# Patient Record
Sex: FEMALE | Race: WHITE | HISPANIC OR LATINO | Employment: PART TIME | URBAN - METROPOLITAN AREA
[De-identification: names, ages, dates, MRNs, and addresses within clinical notes are randomized per-mention and may not be internally consistent; named-entity substitution may affect disease eponyms.]

---

## 2020-01-17 PROBLEM — M22.40 CHONDROMALACIA PATELLAE: Status: ACTIVE | Noted: 2020-01-17

## 2020-01-17 PROBLEM — M25.569 KNEE PAIN: Status: ACTIVE | Noted: 2020-01-17

## 2020-01-19 PROBLEM — T78.05XA ANAPHYLAXIS DUE TO TREE NUT: Status: ACTIVE | Noted: 2020-01-19

## 2020-01-19 PROBLEM — H10.13 ALLERGIC CONJUNCTIVITIS OF BOTH EYES: Status: ACTIVE | Noted: 2020-01-19

## 2020-01-19 PROBLEM — J30.1 CHRONIC ALLERGIC RHINITIS DUE TO POLLEN: Status: ACTIVE | Noted: 2020-01-19

## 2020-01-19 PROBLEM — H01.131: Status: ACTIVE | Noted: 2020-01-19

## 2020-01-19 PROBLEM — H01.132: Status: ACTIVE | Noted: 2020-01-19

## 2020-01-19 PROBLEM — L20.84 INTRINSIC ATOPIC DERMATITIS: Status: ACTIVE | Noted: 2020-01-19

## 2020-01-19 PROBLEM — J45.991 COUGH VARIANT ASTHMA: Status: ACTIVE | Noted: 2020-01-19

## 2020-01-19 PROBLEM — J30.81 ALLERGIC RHINITIS DUE TO ANIMAL (CAT) (DOG) HAIR AND DANDER: Status: ACTIVE | Noted: 2020-01-19

## 2020-01-19 PROBLEM — T78.1XXA POLLEN-FOOD ALLERGY: Status: ACTIVE | Noted: 2020-01-19

## 2020-01-19 PROBLEM — J30.89 ALLERGIC RHINITIS DUE TO HOUSE DUST MITE: Status: ACTIVE | Noted: 2020-01-19

## 2020-12-14 ENCOUNTER — APPOINTMENT (OUTPATIENT)
Dept: URGENT CARE | Facility: MEDICAL CENTER | Age: 20
End: 2020-12-14

## 2020-12-14 ENCOUNTER — APPOINTMENT (OUTPATIENT)
Dept: LAB | Facility: MEDICAL CENTER | Age: 20
End: 2020-12-14

## 2020-12-14 ENCOUNTER — TRANSCRIBE ORDERS (OUTPATIENT)
Dept: URGENT CARE | Facility: MEDICAL CENTER | Age: 20
End: 2020-12-14

## 2020-12-14 DIAGNOSIS — Z02.1 PRE-EMPLOYMENT HEALTH SCREENING EXAMINATION: Primary | ICD-10-CM

## 2020-12-14 DIAGNOSIS — Z02.1 PRE-EMPLOYMENT HEALTH SCREENING EXAMINATION: ICD-10-CM

## 2020-12-14 PROCEDURE — 86480 TB TEST CELL IMMUN MEASURE: CPT

## 2020-12-14 PROCEDURE — 36415 COLL VENOUS BLD VENIPUNCTURE: CPT

## 2020-12-16 LAB
GAMMA INTERFERON BACKGROUND BLD IA-ACNC: 0.04 IU/ML
M TB IFN-G BLD-IMP: NEGATIVE
M TB IFN-G CD4+ BCKGRND COR BLD-ACNC: 0 IU/ML
M TB IFN-G CD4+ BCKGRND COR BLD-ACNC: 0.01 IU/ML
MITOGEN IGNF BCKGRD COR BLD-ACNC: >10 IU/ML

## 2021-01-09 ENCOUNTER — IMMUNIZATIONS (OUTPATIENT)
Dept: FAMILY MEDICINE CLINIC | Facility: HOSPITAL | Age: 21
End: 2021-01-09

## 2021-01-09 DIAGNOSIS — Z23 ENCOUNTER FOR IMMUNIZATION: ICD-10-CM

## 2021-01-09 PROCEDURE — 0011A SARS-COV-2 / COVID-19 MRNA VACCINE (MODERNA) 100 MCG: CPT

## 2021-01-09 PROCEDURE — 91301 SARS-COV-2 / COVID-19 MRNA VACCINE (MODERNA) 100 MCG: CPT

## 2021-01-29 ENCOUNTER — LAB (OUTPATIENT)
Dept: LAB | Age: 21
End: 2021-01-29

## 2021-01-29 ENCOUNTER — TRANSCRIBE ORDERS (OUTPATIENT)
Dept: ADMINISTRATIVE | Age: 21
End: 2021-01-29

## 2021-01-29 DIAGNOSIS — Z01.84 IMMUNITY STATUS TESTING: Primary | ICD-10-CM

## 2021-01-29 DIAGNOSIS — Z01.84 IMMUNITY STATUS TESTING: ICD-10-CM

## 2021-01-29 LAB — RUBV IGG SERPL IA-ACNC: 144.9 IU/ML

## 2021-01-29 PROCEDURE — 86762 RUBELLA ANTIBODY: CPT

## 2021-01-29 PROCEDURE — 86480 TB TEST CELL IMMUN MEASURE: CPT

## 2021-01-29 PROCEDURE — 86787 VARICELLA-ZOSTER ANTIBODY: CPT

## 2021-01-29 PROCEDURE — 86765 RUBEOLA ANTIBODY: CPT

## 2021-01-29 PROCEDURE — 86735 MUMPS ANTIBODY: CPT

## 2021-02-02 LAB — MEV IGG SER QL: NORMAL

## 2021-02-03 LAB
GAMMA INTERFERON BACKGROUND BLD IA-ACNC: 0.05 IU/ML
M TB IFN-G BLD-IMP: NEGATIVE
M TB IFN-G CD4+ BCKGRND COR BLD-ACNC: -0.01 IU/ML
M TB IFN-G CD4+ BCKGRND COR BLD-ACNC: 0 IU/ML
MITOGEN IGNF BCKGRD COR BLD-ACNC: >10 IU/ML
MUV IGG SER QL: NORMAL
VZV IGG SER IA-ACNC: NORMAL

## 2021-02-05 ENCOUNTER — IMMUNIZATIONS (OUTPATIENT)
Dept: FAMILY MEDICINE CLINIC | Facility: HOSPITAL | Age: 21
End: 2021-02-05

## 2021-02-05 DIAGNOSIS — Z23 ENCOUNTER FOR IMMUNIZATION: Primary | ICD-10-CM

## 2021-02-05 PROCEDURE — 91301 SARS-COV-2 / COVID-19 MRNA VACCINE (MODERNA) 100 MCG: CPT

## 2021-02-05 PROCEDURE — 0012A SARS-COV-2 / COVID-19 MRNA VACCINE (MODERNA) 100 MCG: CPT

## 2021-02-15 PROBLEM — R21 FACIAL RASH: Status: ACTIVE | Noted: 2020-06-30

## 2021-02-15 PROBLEM — Z91.018 TREE NUT ALLERGY: Status: ACTIVE | Noted: 2020-06-30

## 2021-12-20 PROCEDURE — U0003 INFECTIOUS AGENT DETECTION BY NUCLEIC ACID (DNA OR RNA); SEVERE ACUTE RESPIRATORY SYNDROME CORONAVIRUS 2 (SARS-COV-2) (CORONAVIRUS DISEASE [COVID-19]), AMPLIFIED PROBE TECHNIQUE, MAKING USE OF HIGH THROUGHPUT TECHNOLOGIES AS DESCRIBED BY CMS-2020-01-R: HCPCS | Performed by: PHYSICIAN ASSISTANT

## 2021-12-20 PROCEDURE — U0005 INFEC AGEN DETEC AMPLI PROBE: HCPCS | Performed by: PHYSICIAN ASSISTANT

## 2022-08-06 ENCOUNTER — HOSPITAL ENCOUNTER (EMERGENCY)
Facility: HOSPITAL | Age: 22
Discharge: HOME | End: 2022-08-06
Attending: EMERGENCY MEDICINE
Payer: COMMERCIAL

## 2022-08-06 VITALS
RESPIRATION RATE: 18 BRPM | SYSTOLIC BLOOD PRESSURE: 104 MMHG | HEART RATE: 78 BPM | OXYGEN SATURATION: 96 % | DIASTOLIC BLOOD PRESSURE: 60 MMHG

## 2022-08-06 DIAGNOSIS — T78.2XXA ANAPHYLAXIS, INITIAL ENCOUNTER: Primary | ICD-10-CM

## 2022-08-06 PROCEDURE — 96375 TX/PRO/DX INJ NEW DRUG ADDON: CPT

## 2022-08-06 PROCEDURE — 96374 THER/PROPH/DIAG INJ IV PUSH: CPT

## 2022-08-06 PROCEDURE — 96372 THER/PROPH/DIAG INJ SC/IM: CPT

## 2022-08-06 PROCEDURE — 99283 EMERGENCY DEPT VISIT LOW MDM: CPT

## 2022-08-06 PROCEDURE — 99284 EMERGENCY DEPT VISIT MOD MDM: CPT | Performed by: EMERGENCY MEDICINE

## 2022-08-06 RX ORDER — EPINEPHRINE 0.3 MG/.3ML
0.3 INJECTION SUBCUTANEOUS ONCE
Qty: 0.3 ML | Refills: 0 | Status: SHIPPED | OUTPATIENT
Start: 2022-08-06 | End: 2022-08-06

## 2022-08-06 RX ORDER — DIPHENHYDRAMINE HYDROCHLORIDE 50 MG/ML
25 INJECTION INTRAMUSCULAR; INTRAVENOUS ONCE
Status: COMPLETED | OUTPATIENT
Start: 2022-08-06 | End: 2022-08-06

## 2022-08-06 RX ORDER — DEXAMETHASONE SODIUM PHOSPHATE 10 MG/ML
10 INJECTION, SOLUTION INTRAMUSCULAR; INTRAVENOUS ONCE
Status: COMPLETED | OUTPATIENT
Start: 2022-08-06 | End: 2022-08-06

## 2022-08-06 RX ORDER — EPINEPHRINE 1 MG/ML
0.5 INJECTION, SOLUTION, CONCENTRATE INTRAVENOUS ONCE
Status: COMPLETED | OUTPATIENT
Start: 2022-08-06 | End: 2022-08-06

## 2022-08-06 RX ADMIN — EPINEPHRINE 0.5 MG: 1 INJECTION, SOLUTION, CONCENTRATE INTRAVENOUS at 19:31

## 2022-08-06 RX ADMIN — DEXAMETHASONE SODIUM PHOSPHATE 10 MG: 10 INJECTION, SOLUTION INTRAMUSCULAR; INTRAVENOUS at 19:38

## 2022-08-06 RX ADMIN — DIPHENHYDRAMINE HYDROCHLORIDE 25 MG: 50 INJECTION, SOLUTION INTRAMUSCULAR; INTRAVENOUS at 19:38

## 2022-08-06 NOTE — ED ATTENDING ATTESTATION
Final Diagnosis:  1  Anaphylaxis, initial encounter           I, Vanessa Bridges MD, saw and evaluated the patient  All available labs and X-rays were ordered by me or the resident and have been reviewed by myself  I discussed the patient with the resident / non-physician and agree with the resident's / non-physician practitioner's findings and plan as documented in the resident's / non-physician practicitioner's note, except where noted  At this point, I agree with the current assessment done in the ED  I was present during key portions of all procedures performed unless otherwise stated  Chief Complaint   Patient presents with    Allergic Reaction     Pt ate tree nuts on accident and has a slight rash on her face  Pt states she started feeling her throat start to swell     This is a 24 y o  female presenting for evaluation of ingestion of tree nuts  She ate food, started to notice lip swelling, redness, throat swelling sensation  Hx of anaphylaxis and is supposed to carry around an EPI pen but doesn't  So came in  No f/ch/n/v  No CP/SOB  No wheezing    Seen immediately on arrival    A: intact  B: bilateral breath sounds  C: 2+ pulses, mild tachycardia HR   D: awake, alert  E: redness around lips; obvious swellinjg of lips  No urticaria    A/P:  - I think that the patient might have anaphylaxis and will treat as such  - Will do EPI (0 01mg/kg, max 0 5mg) immediately into the thigh for better absorption   - Will place IV, solumedrol (125mg), Pepcid (20mg), Benadryl (50mg)  - IVF for hydration meanwhile  - Will observe for at least 2 hours  The incidence of clinically important bimodal reactions are extremely rare  ([facility] Andre;63(6):736-44 e2  doi: 10 1016/j annemergmed  2013 10 017  Epub 2013 Nov 13;  Of 178,799 ED visits --> 2,819 (0 66%) encounters --> 496 anaphylactic and 2,323 allergic reactions --> Five clinically important biphasic reactions were identified (0 18%) --> 2 occurring during the ED visit and 3 postdischarge --> no fatalities  Further, in the anaphylaxis and allergic reaction groups, clinically important biphasic reactions occurred in 2 patients (0 40%; 95% CI 0 07% to 1 6%) and 3 patients (0 13%; 95% CI 0 03% to 0 41%), respectively  )  - If continued anaphylaxis without improvement, will discuss do an EPI drip  Will start with dirty EPI drip (1mg of EPI + 1000mL NaCl 0 9% making 1mcg/mL -->  wide-open 18-gauge IV, the patient will receive about 20-30 mL/min (or 20-30 mcg/min) of epinephrine or push-dose epi (0 1 mg or 100 mcg over 5 minutes = 20 mcg per minute)  - If rebound or continued, will admit    Kenzie Hooks DC home with EPI script + steroids (Decadron 10mg here OR Prednisone x3-5 days) + anti-histamine (Benadryl OR Atarax)  - Patient is to f/u with the patient's PCP for re-evaluation  - 13 point ROS was performed and all are normal unless stated in the history above  - Nursing note reviewed  Vitals reviewed  - Orders placed by myself and/or advanced practitioner / resident     - Previous chart was reviewed  - No language barrier    - History obtained from patient  - There are no limitations to the history obtained  - Critical care time: 35 minutes  - Critical care time was exclusive of seperately bilable procedures and treating other patients as well as teaching time  - Critical care was necessary to treat or prevent imminent or life-threatening deterioration of the following condition: anaphylaxis evaluation  - Critical care time was spent personally by me on the following activities as well as the above as per the ED course and rest of chart: obtaining history from patient / surrogate, developement of a treatment plan, evaluation of patient's response to the treatment, examination of the patient, ordering/performing treatements and interventions, re-evaluation of the patient's condition, review of old charts       PMH:   has a past medical history of Allergies, Asthma, and Eczema  PSH:   has a past surgical history that includes Lamoni tooth extraction and Knee arthroscopy (Left, 2015)  Social:  Social History     Substance and Sexual Activity   Alcohol Use Never     Social History     Tobacco Use   Smoking Status Never Smoker   Smokeless Tobacco Never Used     Social History     Substance and Sexual Activity   Drug Use Never     PE:  Vitals:    08/06/22 1930 08/06/22 1943   BP: 129/75    Pulse: 90    Resp:  14   SpO2: 100%      Code Status: No Order  Advance Directive and Living Will:      Power of :    POLST:      Medications   EPINEPHrine PF (ADRENALIN) 1 mg/mL injection 0 5 mg (0 5 mg Intramuscular Given 8/6/22 1931)   dexamethasone (PF) (DECADRON) injection 10 mg (10 mg Intravenous Given 8/6/22 1938)   diphenhydrAMINE (BENADRYL) injection 25 mg (25 mg Intravenous Given 8/6/22 1938)     No orders to display     Orders Placed This Encounter   Procedures    Insert peripheral IV    Continuous cardiac monitoring     Labs Reviewed - No data to display  Time reflects when diagnosis was documented in both MDM as applicable and the Disposition within this note     Time User Action Codes Description Comment    8/6/2022  7:53 PM Chrissy Keene  2XXA] Anaphylaxis, initial encounter       ED Disposition     ED Disposition   Discharge    Condition   Stable    Date/Time   Sat Aug 6, 2022  7:53 PM    Astrid Flowers discharge to home/self care                 Follow-up Information     Follow up With Specialties Details Why Contact Info Additional [address] Emergency Department Emergency Medicine Go to  If symptoms worsen [address] [phone]  [address] Emergency Department, [address], [place], [id number] [place] [address], MD Family Medicine   [address] [place] [id number]  [phone]           Patient's Medications   Discharge Prescriptions    EPINEPHRINE (EPIPEN) 0 3 MG/0 3 ML SOAJ    Inject 0 3 mL (0 3 mg total) into a muscle once for 1 dose       Start Date: 8/6/2022  End Date: 8/6/2022       Order Dose: 0 3 mg       Quantity: 0 3 mL    Refills: 0     No discharge procedures on file  Prior to Admission Medications   Prescriptions Last Dose Informant Patient Reported? Taking? EPINEPHrine (EPIPEN) 0 3 mg/0 3 mL SOAJ   No No   Sig: Inject 0 3 mL (0 3 mg total) into a muscle once for 1 dose   cetirizine (ZyrTEC) 10 mg tablet   Yes No   Sig: Take 10 mg by mouth daily   dupilumab (DUPIXENT) subcutaneous injection   No No   Sig: Inject 2 mL (300 mg total) under the skin every 14 (fourteen) days   fluticasone-salmeterol (ADVAIR HFA) 230-21 MCG/ACT inhaler   No No   Sig: Inhale 2 puffs 2 (two) times a day Rinse mouth after use  mometasone (ELOCON) 0 1 % ointment   No No   Sig: Apply topically daily      Facility-Administered Medications: None       Portions of the record may have been created with voice recognition software  Occasional wrong word or "sound a like" substitutions may have occurred due to the inherent limitations of voice recognition software  Read the chart carefully and recognize, using context, where substitutions have occurred      Electronically signed by:  Hilarie Fothergill

## 2022-08-06 NOTE — Clinical Note
Yesenia De La Cruz was seen and treated in our emergency department on 8/6/2022  No restrictions            Diagnosis: anaphylaxis  Kassie    She may return on this date: 08/06/2022         If you have any questions or concerns, please don't hesitate to call        Almita Duffy MD    ______________________________           _______________          _______________  Hospital Representative                              Date                                Time

## 2022-08-06 NOTE — DISCHARGE INSTRUCTIONS
Please return to the ED if you develop any new or worsening symptoms, such as difficulty breathing, wheezing, or chest pain  Please fill your prescription for an EpiPen if you do not have one at home

## 2022-08-07 NOTE — ED PROVIDER NOTES
History  Chief Complaint   Patient presents with    Allergic Reaction     Pt ate tree nuts on accident and has a slight rash on her face  Pt states she started feeling her throat start to swell     Patient is a 23 y/o female w/ hx of nut allergy, hx of anaphylactic reactions who presents with throat closing sensation  Patient works at [employer] and was eating some food that she was not aware had nuts in it  She started developing sudden onset lip puffiness, facial itchiness, and sensation of throat closing  She says this feels like she is having an anaphylactic reaction  She does own an EpiPen but forgets to carry it with her  She denies any fevers, chills, difficulty handling secretions, GI upset  Prior to Admission Medications   Prescriptions Last Dose Informant Patient Reported? Taking? EPINEPHrine (EPIPEN) 0 3 mg/0 3 mL SOAJ   No No   Sig: Inject 0 3 mL (0 3 mg total) into a muscle once for 1 dose   cetirizine (ZyrTEC) 10 mg tablet   Yes No   Sig: Take 10 mg by mouth daily   dupilumab (DUPIXENT) subcutaneous injection   No No   Sig: Inject 2 mL (300 mg total) under the skin every 14 (fourteen) days   fluticasone-salmeterol (ADVAIR HFA) 230-21 MCG/ACT inhaler   No No   Sig: Inhale 2 puffs 2 (two) times a day Rinse mouth after use  mometasone (ELOCON) 0 1 % ointment   No No   Sig: Apply topically daily      Facility-Administered Medications: None       Past Medical History:   Diagnosis Date    Allergies     Asthma     Eczema        Past Surgical History:   Procedure Laterality Date    KNEE ARTHROSCOPY Left 2015    WISDOM TOOTH EXTRACTION      X2        Family History   Problem Relation Age of Onset    No Known Problems Mother     Diabetes type II Father     No Known Problems Brother      I have reviewed and agree with the history as documented      E-Cigarette/Vaping    E-Cigarette Use Never User      E-Cigarette/Vaping Substances     Social History     Tobacco Use    Smoking status: Never Smoker  Smokeless tobacco: Never Used   Vaping Use    Vaping Use: Never used   Substance Use Topics    Alcohol use: Never    Drug use: Never        Review of Systems   Constitutional: Negative for chills and fever  HENT: Negative for drooling, rhinorrhea, sore throat and trouble swallowing  Eyes: Negative for pain, redness and itching  Respiratory: Negative for cough, choking, shortness of breath, wheezing and stridor  Cardiovascular: Negative for chest pain, palpitations and leg swelling  Gastrointestinal: Negative for abdominal pain, diarrhea, nausea and vomiting  Genitourinary: Negative for dysuria and flank pain  Musculoskeletal: Negative for back pain and myalgias  Skin: Positive for rash  Negative for pallor  Neurological: Negative for dizziness, light-headedness and headaches  Psychiatric/Behavioral: Negative for confusion and decreased concentration  Physical Exam  ED Triage Vitals   Temp Pulse Respirations Blood Pressure SpO2   -- 08/06/22 1930 08/06/22 1943 08/06/22 1930 08/06/22 1930    90 14 129/75 100 %      Temp src Heart Rate Source Patient Position - Orthostatic VS BP Location FiO2 (%)   -- 08/06/22 2030 -- -- --    Monitor         Pain Score       --                    Orthostatic Vital Signs  Vitals:    08/06/22 1930 08/06/22 2030 08/06/22 2130   BP: 129/75 110/58 104/60   Pulse: 90 80 78       Physical Exam  Constitutional:       General: She is not in acute distress  Appearance: Normal appearance  She is normal weight  She is not ill-appearing, toxic-appearing or diaphoretic  HENT:      Head: Normocephalic and atraumatic  Right Ear: External ear normal       Left Ear: External ear normal       Mouth/Throat:      Mouth: Mucous membranes are moist       Pharynx: Oropharynx is clear  No posterior oropharyngeal erythema  Comments: No soft tissue swelling  Eyes:      General:         Right eye: No discharge  Left eye: No discharge  Conjunctiva/sclera: Conjunctivae normal    Cardiovascular:      Rate and Rhythm: Normal rate and regular rhythm  Pulses: Normal pulses  Heart sounds: No murmur heard  No friction rub  No gallop  Pulmonary:      Effort: Pulmonary effort is normal  No respiratory distress  Breath sounds: Normal breath sounds  No stridor  No wheezing or rales  Abdominal:      General: Abdomen is flat  Bowel sounds are normal       Tenderness: There is no abdominal tenderness  There is no guarding or rebound  Musculoskeletal:         General: No swelling  Right lower leg: No edema  Left lower leg: No edema  Skin:     General: Skin is warm and dry  Capillary Refill: Capillary refill takes less than 2 seconds  Findings: Rash present  Comments: There is redness and swelling of the lips   Neurological:      Mental Status: She is alert  Psychiatric:         Mood and Affect: Mood normal          Behavior: Behavior normal          Thought Content: Thought content normal          Judgment: Judgment normal          ED Medications  Medications   EPINEPHrine PF (ADRENALIN) 1 mg/mL injection 0 5 mg (0 5 mg Intramuscular Given 8/6/22 1931)   dexamethasone (PF) (DECADRON) injection 10 mg (10 mg Intravenous Given 8/6/22 1938)   diphenhydrAMINE (BENADRYL) injection 25 mg (25 mg Intravenous Given 8/6/22 1938)       Diagnostic Studies  Results Reviewed     None                 No orders to display         Procedures  Procedures      ED Course  ED Course as of 08/06/22 2251   Sat Aug 06, 2022   2000 Patient states that she is a little sore throat but denies any other symptoms at this time  MDM  Number of Diagnoses or Management Options  Anaphylaxis, initial encounter: established and improving  Diagnosis management comments: Patient is a 23 y/o female w/ history of anaphylaxis to nuts who presents with anaphylactic reaction   Patient is hemodynamically stable, airway intact  Will admin epinephrine, decadron, benadryl, frequent checks for symptom recurrence  Patient remains asymptomatic after 2 hours, will discharge home with prescription for EpiPen since patient says she is not sure if she has one at home  Amount and/or Complexity of Data Reviewed  Review and summarize past medical records: yes  Discuss the patient with other providers: yes    Patient Progress  Patient progress: improved      Disposition  Final diagnoses:   Anaphylaxis, initial encounter     Time reflects when diagnosis was documented in both MDM as applicable and the Disposition within this note     Time User Action Codes Description Comment    8/6/2022  7:53 PM  Leos  2XXA] Anaphylaxis, initial encounter     8/6/2022  9:33 PM Shelbie Lace Add [T78 40XA] Allergic reaction, initial encounter     8/6/2022  9:34 PM Shelbie Lace Remove [T78 40XA] Allergic reaction, initial encounter       ED Disposition     ED Disposition   Discharge    Condition   Good    Date/Time   Sat Aug 6, 2022  9:33 PM    Comment   Ilir Anne discharge to home/self care                 Follow-up Information     Follow up With Specialties Details Why Contact Info Additional Information    Wing Porter MD Family Medicine   [address] [place] [address]  [facility]  [facility] [place] [address]  [address] Emergency Department Emergency Medicine Go to  If symptoms worsen or if you have any other specific concerns [address] [phone]  [address] [place] [address] Emergency Department, [address] [place] [address], [address]          Discharge Medication List as of 8/6/2022  9:35 PM      CONTINUE these medications which have CHANGED    Details   EPINEPHrine (EPIPEN) 0 3 mg/0 3 mL SOAJ Inject 0 3 mL (0 3 mg total) into a muscle once for 1 dose, Starting Sat 8/6/2022, Normal         CONTINUE these medications which have NOT CHANGED    Details   cetirizine (ZyrTEC) 10 mg tablet Take 10 mg by mouth daily, Historical Med      dupilumab (DUPIXENT) subcutaneous injection Inject 2 mL (300 mg total) under the skin every 14 (fourteen) days, Starting Sun 1/19/2020, No Print      fluticasone-salmeterol (ADVAIR HFA) 230-21 MCG/ACT inhaler Inhale 2 puffs 2 (two) times a day Rinse mouth after use , Starting Thu 3/3/2022, Until Fri 3/3/2023, Normal      mometasone (ELOCON) 0 1 % ointment Apply topically daily, Starting Fri 1/17/2020, Normal           No discharge procedures on file  PDMP Review     None           ED Provider  Attending physically available and evaluated Fior Mayen I managed the patient along with the ED Attending      Electronically Signed by         Almita Lazaro MD  08/06/22 5945

## 2023-12-11 ENCOUNTER — HOSPITAL ENCOUNTER (EMERGENCY)
Facility: HOSPITAL | Age: 23
Discharge: HOME | End: 2023-12-11
Attending: EMERGENCY MEDICINE | Admitting: EMERGENCY MEDICINE
Payer: COMMERCIAL

## 2023-12-11 VITALS
OXYGEN SATURATION: 100 % | DIASTOLIC BLOOD PRESSURE: 70 MMHG | SYSTOLIC BLOOD PRESSURE: 117 MMHG | RESPIRATION RATE: 18 BRPM | HEART RATE: 79 BPM | TEMPERATURE: 98.1 F

## 2023-12-11 DIAGNOSIS — R21 FACIAL RASH: Primary | ICD-10-CM

## 2023-12-11 PROCEDURE — 99282 EMERGENCY DEPT VISIT SF MDM: CPT

## 2023-12-11 PROCEDURE — 99284 EMERGENCY DEPT VISIT MOD MDM: CPT | Performed by: EMERGENCY MEDICINE

## 2023-12-11 RX ORDER — PREDNISONE 20 MG/1
40 TABLET ORAL DAILY
Qty: 10 TABLET | Refills: 0 | Status: SHIPPED | OUTPATIENT
Start: 2023-12-11 | End: 2023-12-16

## 2023-12-11 RX ORDER — PREDNISONE 20 MG/1
40 TABLET ORAL ONCE
Status: COMPLETED | OUTPATIENT
Start: 2023-12-11 | End: 2023-12-11

## 2023-12-11 RX ORDER — VALACYCLOVIR HYDROCHLORIDE 1 G/1
1000 TABLET, FILM COATED ORAL ONCE
Status: COMPLETED | OUTPATIENT
Start: 2023-12-11 | End: 2023-12-11

## 2023-12-11 RX ORDER — VALACYCLOVIR HYDROCHLORIDE 1 G/1
1000 TABLET, FILM COATED ORAL 3 TIMES DAILY
Qty: 21 TABLET | Refills: 0 | Status: SHIPPED | OUTPATIENT
Start: 2023-12-11 | End: 2023-12-18

## 2023-12-11 RX ADMIN — VALACYCLOVIR 1000 MG: 1 TABLET, FILM COATED ORAL at 11:43

## 2023-12-11 RX ADMIN — PREDNISONE 40 MG: 20 TABLET ORAL at 11:44

## 2023-12-11 NOTE — ED ATTENDING ATTESTATION
Hailey Lewis MD, saw and evaluated the patient. I have discussed the patient with the resident and agree with the resident's findings, Plan of Care, and MDM as documented in the resident's note, except where noted. All available labs and Radiology studies were reviewed. I was present for key portions of any procedure(s) performed by the resident and I was immediately available to provide assistance. At this point I agree with the current assessment done in the Emergency Department. I have conducted an independent evaluation of this patient a history and physical is as follows:    23 yo female with a history of eczema, asthma, and allergic rhinitis presents to the ED complaining of a rash to her left orbit x 3 days. The patient reports a progressively worsening red, itchy, and occasionally painful rash to left medial orbit. She says she has had this rash in the same area several times before and was successfully treated with a combination of antibiotics, oral steroids, and antivirals. She also gets a similar rash to her hands several times per year. She has been applying Tacrolimus cream without improvement. No eye pain, visual disturbance, or eye redness. She denies fevers/chills. No other specific complaints. ROS: per resident physician note    Gen: NAD, AA&Ox3  HEENT: PERRL, EOMI  Neck: supple  CV: RRR  Lungs: CTA B/L  Abdomen: soft, NT/ND  Ext: no swelling or deformity  Neuro: 5/5 strength all extremities, sensation grossly intact  Skin: (+) erythematous vesicular rash just medial to left eye    ED Course  The patient is well appearing with stable vital signs. (+) Mild vesicular rash to left medial orbit but no obvious eye involvement. Rash and history are most consistent with dyshidrotic eczema. Lower clinical concern for HSV or a bacterial infection. Plan for a course of oral steroids and Valtrex. The patient was instructed to follow up with Dermatology for further workup/management.  She is agreeable to this plan. Ambulatory referral entered. Strict return precautions provided.       Critical Care Time  Procedures

## 2023-12-11 NOTE — ED PROVIDER NOTES
History  Chief Complaint   Patient presents with    Rash     Pt presents with rash to left eye. Redness noted to L eye      50-year-old female with a history of eczema, seasonal allergies, and asthma who presents complaining of a rash above her left eye that began 3 days ago. Patient states that she has had a similar rash in that location in the past.  The patient states that the rash is painful. She has previously been treated with a valacyclovir, prednisone, and Keflex. The patient has been applying tacrolimus cream without improvement. The patient follows with an allergist for management of her eczema. The patient denies fever, chills, visual disturbances pain, URI symptoms, chest pain, shortness of breath, nausea, vomiting, abdominal pain. Prior to Admission Medications   Prescriptions Last Dose Informant Patient Reported? Taking? EPINEPHrine (EPIPEN) 0.3 mg/0.3 mL SOAJ   No No   Sig: Inject 0.3 mL (0.3 mg total) into a muscle once for 1 dose   cetirizine (ZyrTEC) 10 mg tablet   Yes No   Sig: Take 10 mg by mouth daily   dupilumab (DUPIXENT) subcutaneous injection   No No   Sig: Inject 2 mL (300 mg total) under the skin every 14 (fourteen) days   fluticasone-salmeterol (ADVAIR HFA) 230-21 MCG/ACT inhaler   No No   Sig: Inhale 2 puffs 2 (two) times a day Rinse mouth after use.    mometasone (ELOCON) 0.1 % ointment   No No   Sig: Apply topically daily   tacrolimus (PROTOPIC) 0.1 % ointment   No No   Sig: Apply topically 2 (two) times a day      Facility-Administered Medications: None       Past Medical History:   Diagnosis Date    Allergies     Asthma     Eczema        Past Surgical History:   Procedure Laterality Date    KNEE ARTHROSCOPY Left 2015    WISDOM TOOTH EXTRACTION      X2        Family History   Problem Relation Age of Onset    No Known Problems Mother     Diabetes type II Father     No Known Problems Brother      I have reviewed and agree with the history as documented. E-Cigarette/Vaping    E-Cigarette Use Never User      E-Cigarette/Vaping Substances    Nicotine No     THC No     CBD No     Flavoring No     Other No     Unknown No      Social History     Tobacco Use    Smoking status: Never    Smokeless tobacco: Never   Vaping Use    Vaping Use: Never used   Substance Use Topics    Alcohol use: Yes     Comment: rarely    Drug use: Never        Review of Systems   Constitutional:  Negative for chills, diaphoresis and fever. HENT:  Negative for congestion and sore throat. Eyes:  Negative for pain and redness. Respiratory:  Negative for cough and shortness of breath. Cardiovascular:  Negative for chest pain, palpitations and leg swelling. Gastrointestinal:  Negative for abdominal pain, diarrhea, nausea and vomiting. Genitourinary:  Negative for dysuria, flank pain and hematuria. Musculoskeletal:  Negative for arthralgias and myalgias. Skin:  Positive for rash. Negative for color change and pallor. Neurological:  Negative for dizziness, syncope, weakness, light-headedness, numbness and headaches. All other systems reviewed and are negative. Physical Exam  ED Triage Vitals [12/11/23 1102]   Temperature Pulse Respirations Blood Pressure SpO2   98.1 °F (36.7 °C) 79 18 117/70 100 %      Temp Source Heart Rate Source Patient Position - Orthostatic VS BP Location FiO2 (%)   Oral Monitor -- -- --      Pain Score       --             Orthostatic Vital Signs  Vitals:    12/11/23 1102   BP: 117/70   Pulse: 79       Physical Exam  Vitals and nursing note reviewed. Constitutional:       General: She is not in acute distress. Appearance: Normal appearance. She is not ill-appearing, toxic-appearing or diaphoretic. HENT:      Head: Normocephalic and atraumatic. Nose: Nose normal. No congestion or rhinorrhea. Mouth/Throat:      Mouth: Mucous membranes are moist.      Pharynx: Oropharynx is clear. Eyes:      General: No scleral icterus. Right eye: No discharge. Left eye: No discharge. Extraocular Movements: Extraocular movements intact. Conjunctiva/sclera: Conjunctivae normal.      Pupils: Pupils are equal, round, and reactive to light. Cardiovascular:      Rate and Rhythm: Normal rate and regular rhythm. Pulses: Normal pulses. Heart sounds: Normal heart sounds. No murmur heard. No friction rub. No gallop. Pulmonary:      Effort: Pulmonary effort is normal.      Breath sounds: Normal breath sounds. No wheezing, rhonchi or rales. Abdominal:      General: Abdomen is flat. Palpations: Abdomen is soft. Tenderness: There is no abdominal tenderness. There is no right CVA tenderness, left CVA tenderness, guarding or rebound. Musculoskeletal:         General: No swelling, tenderness, deformity or signs of injury. Normal range of motion. Cervical back: Normal range of motion and neck supple. No rigidity or tenderness. Right lower leg: No edema. Left lower leg: No edema. Lymphadenopathy:      Cervical: No cervical adenopathy. Skin:     General: Skin is warm and dry. Capillary Refill: Capillary refill takes less than 2 seconds. Coloration: Skin is not jaundiced or pale. Findings: Lesion and rash present. No bruising. Comments: Vesicular rash with surrounding erythema medial to the left eyebrow. Neurological:      General: No focal deficit present. Mental Status: She is alert and oriented to person, place, and time.          ED Medications  Medications   valACYclovir (VALTREX) tablet 1,000 mg (1,000 mg Oral Given 12/11/23 1143)   predniSONE tablet 40 mg (40 mg Oral Given 12/11/23 1144)       Diagnostic Studies  Results Reviewed       None                   No orders to display         Procedures  Procedures      ED Course                                       Medical Decision Making  25-year-old female with a history of eczema, seasonal allergies, and asthma who presents complaining of a rash above her left eye that began 3 days ago. Vitals are within the normal limits. Exam is significant for vesicular rash with surrounding erythema medial to the left eyebrow, conjunctival normal, PERRL, EOMI. Differential diagnosis includes eczema and herpes zoster. Will treat with prednisone and valacyclovir. Will additionally refer the patient to dermatology. Return precautions are given and the patient is discharged. Risk  Prescription drug management. Disposition  Final diagnoses:   Facial rash     Time reflects when diagnosis was documented in both MDM as applicable and the Disposition within this note       Time User Action Codes Description Comment    12/11/2023 11:24 AM Radha Haile Add [R21] Facial rash           ED Disposition       ED Disposition   Discharge    Condition   Stable    Date/Time   Mon Dec 11, 2023 11:28 AM    Comment   Aretha Dunn discharge to home/self care.                    Follow-up Information       Follow up With Specialties Details Why Contact Info Additional [address] [facility] Emergency Department Emergency Medicine Go to  If symptoms worsen [address] [facility] Emergency Department, [address] [facility]            Discharge Medication List as of 12/11/2023 11:30 AM        START taking these medications    Details   predniSONE 20 mg tablet Take 2 tablets (40 mg total) by mouth daily for 5 days, Starting Mon 12/11/2023, Until Sat 12/16/2023, Normal      valACYclovir (VALTREX) 1,000 mg tablet Take 1 tablet (1,000 mg total) by mouth 3 (three) times a day for 7 days, Starting Mon 12/11/2023, Until Mon 12/18/2023, Normal           CONTINUE these medications which have NOT CHANGED    Details   cetirizine (ZyrTEC) 10 mg tablet Take 10 mg by mouth daily, Historical Med      dupilumab ([facility]) subcutaneous injection Inject 2 mL (300 mg total) under the skin every 14 (fourteen) days, Starting Sun 1/19/2020, No Print      EPINEPHrine (EPIPEN) 0.3 mg/0.3 mL SOAJ Inject 0.3 mL (0.3 mg total) into a muscle once for 1 dose, Starting Sat 8/6/2022, Normal      fluticasone-salmeterol (ADVAIR HFA) 230-21 MCG/ACT inhaler Inhale 2 puffs 2 (two) times a day Rinse mouth after use., Starting Tue 6/27/2023, Until Wed 6/26/2024, Normal      mometasone (ELOCON) 0.1 % ointment Apply topically daily, Starting Wed 10/11/2023, Normal      tacrolimus (PROTOPIC) 0.1 % ointment Apply topically 2 (two) times a day, Starting Tue 6/27/2023, Until Wed 6/26/2024, Normal               PDMP Review       None             ED Provider  Attending physically available and evaluated Lester Hughes. I managed the patient along with the ED Attending.     Electronically Signed by           Nina Zayas DO  12/11/23 4444

## 2023-12-11 NOTE — DISCHARGE INSTRUCTIONS
You were seen in the emergency department for recurrent facial rash. Rash is most likely a form of eczema but could also be shingles. A prescription for Valtrex and prednisone was sent to pharmacy. Follow-up with dermatology.

## 2024-02-21 PROBLEM — H10.13 ALLERGIC CONJUNCTIVITIS OF BOTH EYES: Status: RESOLVED | Noted: 2020-01-19 | Resolved: 2024-02-21

## 2024-02-29 ENCOUNTER — OFFICE VISIT (OUTPATIENT)
Dept: INTERNAL MEDICINE CLINIC | Facility: CLINIC | Age: 24
End: 2024-02-29
Payer: COMMERCIAL

## 2024-02-29 VITALS
BODY MASS INDEX: 25.42 KG/M2 | OXYGEN SATURATION: 99 % | HEART RATE: 63 BPM | DIASTOLIC BLOOD PRESSURE: 80 MMHG | WEIGHT: 139 LBS | SYSTOLIC BLOOD PRESSURE: 114 MMHG

## 2024-02-29 DIAGNOSIS — Z00.00 ANNUAL PHYSICAL EXAM: Primary | ICD-10-CM

## 2024-02-29 DIAGNOSIS — Z00.00 LABORATORY EXAM ORDERED AS PART OF ROUTINE GENERAL MEDICAL EXAMINATION: ICD-10-CM

## 2024-02-29 DIAGNOSIS — Z13.1 SCREENING FOR DIABETES MELLITUS: ICD-10-CM

## 2024-02-29 DIAGNOSIS — J45.991 COUGH VARIANT ASTHMA: ICD-10-CM

## 2024-02-29 DIAGNOSIS — Z13.220 SCREENING, LIPID: ICD-10-CM

## 2024-02-29 DIAGNOSIS — L20.84 INTRINSIC ATOPIC DERMATITIS: ICD-10-CM

## 2024-02-29 PROCEDURE — 99385 PREV VISIT NEW AGE 18-39: CPT | Performed by: INTERNAL MEDICINE

## 2024-02-29 RX ORDER — PNV NO.95/FERROUS FUM/FOLIC AC 28MG-0.8MG
TABLET ORAL
COMMUNITY

## 2024-02-29 NOTE — PROGRESS NOTES
ADULT ANNUAL PHYSICAL  [facility] - [facility]    NAME: Kassie Pitts  AGE: 23 y.o. SEX: female  : 2000     DATE: 2024     Assessment and Plan:     Problem List Items Addressed This Visit        Respiratory    Cough variant asthma     Stable on Advair managed by Dr Dorantes            Musculoskeletal and Integument    Intrinsic atopic dermatitis     Controlled on Dupixent managed by Dr Dorantes        Other Visit Diagnoses     Annual physical exam    -  Primary    Screening, lipid        Relevant Orders    Lipid panel    Screening for diabetes mellitus        Relevant Orders    Hemoglobin A1C    Laboratory exam ordered as part of routine general medical examination        Relevant Orders    CBC and differential    Comprehensive metabolic panel            Immunizations and preventive care screenings were discussed with patient today. Appropriate education was printed on patient's after visit summary.    Counseling:  Continue healthy diet and regular exercise         Return in about 1 year (around 2025) for Annual physical.     Chief Complaint:     Chief Complaint   Patient presents with   • Establish Care      History of Present Illness:     Adult Annual Physical   Patient here for a comprehensive physical exam. The patient reports no problems.  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   Exercise:  regular .      Depression Screening  PHQ-2/9 Depression Screening    Little interest or pleasure in doing things: 0 - not at all  Feeling down, depressed, or hopeless: 0 - not at all  PHQ-2 Score: 0  PHQ-2 Interpretation: Negative depression screen       General Health    Hearing: normal - bilateral.  Vision: most recent eye exam <1 year ago and just started wearing glasses .   Dental: regular dental visits.       /GYN Health  Follows with gynecology? First visit coming up  Last menstrual period:  regular  Planning to get pregnant soon      Review of Systems:     Review of Systems   Constitutional:  Negative for unexpected weight change.   Respiratory:  Negative for cough and shortness of breath.    Cardiovascular:  Negative for chest pain and palpitations.   Gastrointestinal:  Negative for abdominal pain, constipation and diarrhea.   Genitourinary:  Negative for difficulty urinating and menstrual problem.      Past Medical History:     Past Medical History:   Diagnosis Date   • Allergies    • Asthma    • Eczema       Past Surgical History:     Past Surgical History:   Procedure Laterality Date   • KNEE ARTHROSCOPY Left 2015    patella   • WISDOM TOOTH EXTRACTION      X2       Social History:     Social History     Socioeconomic History   • Marital status: Single     Spouse name: None   • Number of children: 0   • Years of education: None   • Highest education level: None   Occupational History   • Occupation: Medical Assistant      Employer: [employer]     Comment: [employer]    Tobacco Use   • Smoking status: Never   • Smokeless tobacco: Never   Vaping Use   • Vaping status: Never Used   Substance and Sexual Activity   • Alcohol use: Yes     Comment: rarely   • Drug use: Never   • Sexual activity: None   Other Topics Concern   • None   Social History Narrative    Who lives in your home:     What type of home do you live in: Duplex     Age of your home: not sure-but knows it's old     How long have you been living there: 18 yrs     Type of heat: forced hot air     Type of fuel: electric     What type of tye is in your bedroom: hardwood    Do you have the following in or near your home:    Air products: central air    Pests: no     Pets: 1 dog - pit bull    Are pets allowed in bedroom: Yes     Open fields, wooded areas nearby: No     Basement: Unfinished basement-wet with heavy rain - no mold or musty smell     Exposure to second hand smoke: No            Habits:    Caffeine: 2 cups of  caffeine daily - sometimes coffee, sometimes small Red Bull    Chocolate: Dark occasionally     Other:     Social Determinants of Health     Financial Resource Strain: Not on file   Food Insecurity: Not on file   Transportation Needs: Not on file   Physical Activity: Insufficiently Active (6/27/2023)    Exercise Vital Sign    • Days of Exercise per Week: 4 days    • Minutes of Exercise per Session: 30 min   Stress: Not on file   Social Connections: Not on file   Intimate Partner Violence: Not on file   Housing Stability: Not on file      Family History:     Family History   Problem Relation Age of Onset   • No Known Problems Mother    • Diabetes type II Father    • No Known Problems Brother    • Dementia Maternal Grandfather       Current Medications:     Current Outpatient Medications   Medication Sig Dispense Refill   • cetirizine (ZyrTEC) 10 mg tablet Take 10 mg by mouth daily     • Dupixent 300 MG/2ML SOPN Inject 300 mg under the skin every 14 (fourteen) days 4 mL 11   • fluticasone-salmeterol (ADVAIR HFA) 230-21 MCG/ACT inhaler Inhale 2 puffs 2 (two) times a day Rinse mouth after use. 36 g 3   • Prenatal Vit-Fe Fumarate-FA (Prenatal Vitamin) 27-0.8 MG TABS Take by mouth     • EPINEPHrine (EPIPEN) 0.3 mg/0.3 mL SOAJ Inject 0.3 mL (0.3 mg total) into a muscle once for 1 dose 0.3 mL 0   • mometasone (ELOCON) 0.1 % ointment Apply topically daily (Patient taking differently: Apply topically daily PRN for flare up) 45 g 11   • tacrolimus (PROTOPIC) 0.1 % ointment Apply topically 2 (two) times a day (Patient taking differently: Apply topically 2 (two) times a day PRN for flare up) 100 g 12     No current facility-administered medications for this visit.      Allergies:     Allergies   Allergen Reactions   • Nuts - Food Allergy Anaphylaxis, Itching and Other (See Comments)   • Erythromycin Hives   • Erythromycin Base Hives and Rash   • Red Dye - Food Allergy Rash      Physical Exam:     /80 (BP Location: Left  arm, Patient Position: Sitting, Cuff Size: Standard)   Pulse 63   Wt 63 kg (139 lb)   SpO2 99%   BMI 25.42 kg/m²     Physical Exam  Constitutional:       General: She is not in acute distress.     Appearance: She is well-developed. She is not ill-appearing, toxic-appearing or diaphoretic.   HENT:      Right Ear: External ear normal. There is no impacted cerumen.      Left Ear: External ear normal. There is no impacted cerumen.   Eyes:      Conjunctiva/sclera: Conjunctivae normal.   Cardiovascular:      Rate and Rhythm: Normal rate and regular rhythm.      Heart sounds: Normal heart sounds. No murmur heard.  Pulmonary:      Effort: Pulmonary effort is normal. No respiratory distress.      Breath sounds: Normal breath sounds. No stridor. No wheezing or rales.   Abdominal:      General: There is no distension.      Palpations: Abdomen is soft. There is no mass.      Tenderness: There is no abdominal tenderness. There is no guarding or rebound.   Musculoskeletal:      Cervical back: Neck supple.      Right lower leg: No edema.      Left lower leg: No edema.   Skin:     Findings: Lesion (small dry  pink patch on each eye lid) present.   Neurological:      Mental Status: She is alert and oriented to person, place, and time.   Psychiatric:         Mood and Affect: Mood normal.         Behavior: Behavior normal.         Thought Content: Thought content normal.         Judgment: Judgment normal.          Lea Reyes, MD   [facility]

## 2024-03-01 ENCOUNTER — TELEPHONE (OUTPATIENT)
Dept: DERMATOLOGY | Facility: CLINIC | Age: 24
End: 2024-03-01

## 2024-03-01 NOTE — TELEPHONE ENCOUNTER
Lmom that 03/25/24 appt w/Dr Middleton has been cx & r/s due to change in the providers schedule, to please call the office to confirm or r/s.

## 2024-04-03 ENCOUNTER — INITIAL PRENATAL (OUTPATIENT)
Dept: OBGYN CLINIC | Facility: CLINIC | Age: 24
End: 2024-04-03
Payer: COMMERCIAL

## 2024-04-03 VITALS
DIASTOLIC BLOOD PRESSURE: 74 MMHG | HEIGHT: 63 IN | BODY MASS INDEX: 24.91 KG/M2 | WEIGHT: 140.6 LBS | SYSTOLIC BLOOD PRESSURE: 104 MMHG

## 2024-04-03 DIAGNOSIS — N91.1 SECONDARY AMENORRHEA: Primary | ICD-10-CM

## 2024-04-03 PROBLEM — Z34.90 EARLY STAGE OF PREGNANCY: Status: ACTIVE | Noted: 2024-04-03

## 2024-04-03 PROCEDURE — 76817 TRANSVAGINAL US OBSTETRIC: CPT | Performed by: PHYSICIAN ASSISTANT

## 2024-04-03 PROCEDURE — 99203 OFFICE O/P NEW LOW 30 MIN: CPT | Performed by: PHYSICIAN ASSISTANT

## 2024-04-03 NOTE — PROGRESS NOTES
"ASSESSMENT/PLAN    Early pregnancy at 8w3d with a calculated ALLISON of 11/10/2024 based on LMP.     - Genetic screening options reviewed. She is interested in NIPT, so MFM referral placed  - Prenatal care reviewed  - Pregnancy precautions reviewed  - Prenatal Vitamin recommended.       RTO for OB interview and PN-1 visit    SUBJECTIVE:    Kassie Pitts is a 23 y.o.  presenting today for verification of pregnancy.     Patient's last menstrual period was 2024 (exact).    Menses are regular.  This pregnancy was planned    She is accompanied by her  Terence .     Reports nausea, breast tenderness, loss of appetite.   Denies bleeding, cramping.     OB hx significant for: n/a     Taking a prenatal vitamin.     The following portions of the patient's history were reviewed and updated as appropriate: allergies, current medications, past family history, past medical history, past social history, past surgical history, and problem list.    OBJECTIVE:    /74 (BP Location: Left arm, Patient Position: Sitting, Cuff Size: Standard)   Ht 5' 3\" (1.6 m)   Wt 63.8 kg (140 lb 9.6 oz)   LMP 2024 (Approximate)   BMI 24.91 kg/m²     FINDINGS:  See imaging report for details    Single intrauterine pregnancy of 8w4d gestational age.     Additional Findings: Simple 1-subcm LOC     FHR: 165    Ultrasound Probe Disinfection    A transvaginal ultrasound was performed.   Prior to use, disinfection was performed with High Level Disinfection Process (iPixCelon)  Probe serial number [id number]:  [id number] was used    Emily Pascual PA-C  24  3:46 PM    "

## 2024-04-04 ENCOUNTER — TELEPHONE (OUTPATIENT)
Age: 24
End: 2024-04-04

## 2024-04-19 DIAGNOSIS — Z34.01 ENCOUNTER FOR SUPERVISION OF NORMAL FIRST PREGNANCY IN FIRST TRIMESTER: Primary | ICD-10-CM

## 2024-04-25 ENCOUNTER — INITIAL PRENATAL (OUTPATIENT)
Dept: OBGYN CLINIC | Facility: CLINIC | Age: 24
End: 2024-04-25

## 2024-04-25 DIAGNOSIS — Z31.430 ENCOUNTER OF FEMALE FOR TESTING FOR GENETIC DISEASE CARRIER STATUS FOR PROCREATIVE MANAGEMENT: ICD-10-CM

## 2024-04-25 DIAGNOSIS — Z36.9 UNSPECIFIED ANTENATAL SCREENING: ICD-10-CM

## 2024-04-25 DIAGNOSIS — Z34.01 ENCOUNTER FOR SUPERVISION OF NORMAL FIRST PREGNANCY IN FIRST TRIMESTER: Primary | ICD-10-CM

## 2024-04-25 PROCEDURE — OBC

## 2024-04-25 NOTE — PATIENT INSTRUCTIONS
Congratulations!! Please review our Pregnancy Essential Guide and [facility] L&D Virtual tour from our networks website.     [facility] Pregnancy Essentials Guide  [facility] ([email or web address])     [facility] - Virtual Tour ([email or web address])        Pregnancy at 11 to 14 Weeks   AMBULATORY CARE:   Changes happening to your body:  You are now at the end of your first trimester and entering your second trimester. Morning sickness usually goes away by this time. You may have other symptoms such as fatigue, frequent urination, and headaches. You may have gained 2 to 4 pounds by now.  Seek care immediately if:   You have pain or cramping in your abdomen or low back.    You have heavy vaginal bleeding or clotting.    You pass material that looks like tissue or large clots. Collect the material and bring it with you.    Call your doctor or obstetrician if:   You cannot keep food or drinks down, and you are losing weight.    You have light vaginal bleeding.    You have chills or a fever.    You have vaginal itching, burning, or pain.    You have yellow, green, white, or foul-smelling vaginal discharge.    You have pain or burning when you urinate, less urine than usual, or pink or bloody urine.    You have questions or concerns about your condition or care.    How to care for yourself at this stage of your pregnancy:       Get plenty of rest.  You may feel more tired than normal. You may need to take naps or go to bed earlier.    Manage nausea and vomiting.  Avoid fatty and spicy foods. Eat small meals throughout the day instead of large meals. Amber may help to decrease nausea. Ask your healthcare provider about other ways of decreasing nausea and vomiting.    Eat a variety of healthy foods.  Healthy foods include fruits, vegetables, whole-grain breads, low-fat dairy foods, beans, lean meats, and fish. Drink liquids as directed. Ask how much liquid to drink each day and which liquids are best for you.  Limit caffeine to less than 200 milligrams each day. Limit your intake of fish to 2 servings each week. Choose fish low in mercury such as canned light tuna, shrimp, salmon, cod, or tilapia. Do not  eat fish high in mercury such as swordfish, tilefish, alannah mackerel, and shark.         Take prenatal vitamins as directed.  Your need for certain vitamins and minerals, such as folic acid, increases during pregnancy. Prenatal vitamins provide some of the extra vitamins and minerals you need. Prenatal vitamins may also help to decrease the risk of certain birth defects.         Do not smoke.  Smoking increases your risk of a miscarriage and other health problems during your pregnancy. Smoking can cause your baby to be born too early or weigh less at birth. Ask your healthcare provider for information if you need help quitting.    Do not drink alcohol.  Alcohol passes from your body to your baby through the placenta. It can affect your baby's brain development and cause fetal alcohol syndrome (FAS). FAS is a group of conditions that causes mental, behavior, and growth problems.    Talk to your healthcare provider before you take any medicines.  Many medicines may harm your baby if you take them when you are pregnant. Do not take any medicines, vitamins, herbs, or supplements without first talking to your healthcare provider. Never use illegal or street drugs (such as marijuana or cocaine) while you are pregnant.  Safety tips during pregnancy:   Avoid hot tubs and saunas.  Do not use a hot tub or sauna while you are pregnant, especially during your first trimester. Hot tubs and saunas may raise your baby's temperature and increase the risk of birth defects.    Avoid toxoplasmosis.  This is an infection caused by eating raw meat or being around infected cat feces. It can cause birth defects, miscarriages, and other problems. Wash your hands after you touch raw meat. Make sure any meat is well-cooked before you eat it. Avoid  raw eggs and unpasteurized milk. Use gloves or ask someone else to clean your cat's litter box while you are pregnant.    Changes happening with your baby:  Your baby has fully formed fingernails and toenails. Your baby's heartbeat can now be heard. Ask your healthcare provider if you can listen to your baby's heartbeat. By week 14, your baby is over 4 inches long from the top of the head to the rump (baby's bottom). Your baby weighs over 3 ounces.  Prenatal care:  Prenatal care is a series of visits with your healthcare provider throughout your pregnancy. During the first 28 weeks of your pregnancy, you will see your healthcare provider 1 time each month. Prenatal care can help prevent problems during pregnancy and childbirth. Your healthcare provider will check your blood pressure and weight. Your baby's heart rate will also be checked. You may also need the following at some visits:  A pelvic exam  allows your healthcare provider to see your cervix (the bottom part of your uterus). Your healthcare provider will use a speculum to open your vagina. He or she will check the size and shape of your uterus.    Blood tests  may be done to check for any of the following:    Gestational diabetes or anemia (low iron level)    Blood type or Rh factor, or certain birth defects    Immunity to certain diseases, such as chickenpox or rubella    An infection, such as a sexually transmitted infection, HIV, or hepatitis B    Hepatitis B  may need to be prevented or treated. Hepatitis B is inflammation of the liver caused by the hepatitis B virus (HBV). HBV can spread from a mother to her baby during delivery. You will be checked for HBV as early as possible in the first trimester of each pregnancy. You need the test even if you received the hepatitis B vaccine or were tested before. You may need to have an HBV infection treated before you give birth.    Urine tests  may also be done to check for sugar and protein. These can be  signs of gestational diabetes or preeclampsia. Urine tests may also be done to check for signs of infection.    A fetal ultrasound  shows pictures of your baby inside your uterus. The pictures are used to check your baby's development, movement, and position.         Genetic disorder screening tests  may be offered to you. These tests check your baby's risk for genetic disorders such as Down syndrome. A screening test includes a blood test and ultrasound.    Follow up with your doctor or obstetrician as directed:  Go to all prenatal visits. Write down your questions so you remember to ask them during your visits.  © Copyright [employer] 2023 Information is for End User's use only and may not be sold, redistributed or otherwise used for commercial purposes.  The above information is an  only. It is not intended as medical advice for individual conditions or treatments. Talk to your doctor, nurse or pharmacist before following any medical regimen to see if it is safe and effective for you.

## 2024-04-25 NOTE — PROGRESS NOTES
OB INTAKE INTERVIEW  Patient is 23 y.o.y.o. who presents for OB intake at 11-4 wks  She is accompanied by self during this encounter  The father of her baby (Terence) is involved in the pregnancy and they are .        Last Menstrual Period: 24  Ultrasound: Measured 8 weeks 3 days on 4/3/24  Estimated Date of Delivery: 11/10/24 via LMP     Signs/Symptoms of Pregnancy  Current pregnancy symptoms: breast tenderness, fatigue  Constipation YES  Headaches no  Cramping/spotting no  PICA cravings no    Diabetes-    If patient has 1 or more, please order early 1 hour GTT  History of GDM no  BMI >35 no  History of PCOS or current metformin use no  History of LGA/macrosomic infant (4000g/9lbs) no    If patient has 2 or more, please order early 1 hour GTT  BMI>30 no  AMA no  First degree relative with type 2 diabetes YES  History of chronic HTN, hyperlipidemia, elevated A1C no  High risk race (, , ,  or ) YES    Hypertension- if you answer yes to any of the following, please order baseline preeclampsia labs (cbc, comprehensive metabolic panel, urine protein creatinine ratio, uric acid)  History of of chronic HTN no  History of gestational HTN no  History of preeclampsia, eclampsia, or HELLP syndrome no  History of diabetes no  History of lupus, autoimmune disease, kidney disease no    Thyroid- if yes order TSH with reflex T4  History of thyroid disease no    Bleeding Disorder or Hx of DVT-patient or first degree relative with history of. Order the following if not done previously.   (Factor V, antithrombin III, prothrombin gene mutation, protein C and S Ag, lupus anticoagulant, anticardiolipin, beta-2 glycoprotein)   no    OB/GYN-  History of abnormal pap smear no       Date of last pap smear N/A  History of HPV no  History of Herpes/HSV no  History of other STI (gonorrhea, chlamydia, trich) no  History of prior  no  History of prior  no  History  of  delivery prior to 36 weeks 6 days no  History of blood transfusion no  Ok for blood transfusion yes    Substance screening- if yes outside of tobacco for her or anyone in her home-order urine drug screen  History of tobacco use no  Currently using tobacco no  Currently using alcohol no  Presently using drugs no  Past drug use  no  IV drug use- no  Partner drug use no  Parent/Family drug use no    MRSA Screening-   Does the pt have a hx of MRSA? no    Immunizations:  Influenza vaccine given this season yes  Discussed Tdap vaccine yes  Discussed COVID Vaccine yes    Genetic/MFM-  Do you or your partner have a history of any of the following in yourselves or first degree relatives?  Cystic fibrosis no  Spinal muscular atrophy no  Hemoglobinopathy/Sickle Cell/Thalassemia no  Fragile X Intellectual Disability no    If yes, discuss Carrier Screening and recommend consultation with [facility]/Genetic Counseling and place specific [facility] Referral for.    If no, discuss Carrier Screening being completed once in a lifetime as a standard of care lab test. Place orders for Cystic Fibrosis Gene Test ([id number]) and Spinal Muscular Atrophy DNA (IJQ4179)      Appointment for Nuchal Translucency Ultrasound at [facility] scheduled for 24    Interview education  [facility] P[facility] Essentials Book reviewed, discussed and attached to their AVS yes    Nurse/Family Partnership- patient may qualify no; referral placed no    Prenatal lab work scripts  Extra labs ordered: yes  One hour glucola, CF SMA    Aspirin/Preeclampsia Screen    Risk Level Risk Factor Recommendation   LOW Prior Uncomplicated full-term delivery no No Aspirin recommendation        MODERATE Nulliparity YES Recommend low-dose aspirin if     BMI>30 no 2 or more moderate risk factors    Family History Preeclampsia (mother/sister) no     35yr old or greater no      or Low Socioeconomic no     IVF Pregnancy  no     Personal History Risks (low birth weight, prior  adverse preg outcome, >10yr preg interval) no         HIGH History of Preeclampsia no Recommend low-dose aspirin if     Multifetal gestation no 1 or more high risk factors    Chronic HTN no     Type 1 or 2 Diabetes no     Renal Disease no     Autoimmune Disease  no      Contraindications to ASA therapy:  NSAID/ ASA allergy: no  Nasal polyps: no  Asthma with history of ASA induced bronchospasm: no  Relative contraindications:  History of GI bleed: no  Active peptic ulcer disease: no  Severe hepatic dysfunction: no    Patient should be recommended to take ASA 162mg during this pregnancy from 12-36wks to lower her risk of preeclampsia: no      The patient has a history now or in prior pregnancy notable for: ,  Hx of Ezcema,/ Dermatitis,    Hx of Asthma,  BMI 24.9         Details that I feel the provider should be aware of: pt requesting CF & SMA testing. - ordered with bldwk.  Pt is a RN at [employer].       PN1 visit scheduled. The patient was oriented to our practice, reviewed delivering physicians and [facility] for Delivery. All questions were answered. PN in-person interview completed.  Pt  & , Terence, happy with pregnancy.  PN bldwk including a one hour glucose, CF, SMA ordered in epic. Advised to await authorization p.c. prior to having bldwk drawn.  Pt has appts for PNC,  & PN1 .   Advised pt to call with any further questions/concerns.       Interviewed by: Suzi Mack, RN, CLC

## 2024-04-29 ENCOUNTER — APPOINTMENT (OUTPATIENT)
Dept: LAB | Facility: CLINIC | Age: 24
End: 2024-04-29
Payer: COMMERCIAL

## 2024-04-29 ENCOUNTER — ROUTINE PRENATAL (OUTPATIENT)
Dept: PERINATAL CARE | Facility: CLINIC | Age: 24
End: 2024-04-29
Payer: COMMERCIAL

## 2024-04-29 VITALS
SYSTOLIC BLOOD PRESSURE: 104 MMHG | DIASTOLIC BLOOD PRESSURE: 58 MMHG | WEIGHT: 142 LBS | BODY MASS INDEX: 25.16 KG/M2 | HEIGHT: 63 IN | HEART RATE: 77 BPM

## 2024-04-29 DIAGNOSIS — Z36.82 NUCHAL TRANSLUCENCY OF FETUS ON PRENATAL ULTRASOUND: ICD-10-CM

## 2024-04-29 DIAGNOSIS — Z31.430 ENCOUNTER OF FEMALE FOR TESTING FOR GENETIC DISEASE CARRIER STATUS FOR PROCREATIVE MANAGEMENT: ICD-10-CM

## 2024-04-29 DIAGNOSIS — Z34.01 ENCOUNTER FOR SUPERVISION OF NORMAL FIRST PREGNANCY IN FIRST TRIMESTER: ICD-10-CM

## 2024-04-29 DIAGNOSIS — Z3A.12 12 WEEKS GESTATION OF PREGNANCY: ICD-10-CM

## 2024-04-29 DIAGNOSIS — Z36.9 UNSPECIFIED ANTENATAL SCREENING: ICD-10-CM

## 2024-04-29 DIAGNOSIS — Z33.1 PREGNANT STATE, INCIDENTAL: ICD-10-CM

## 2024-04-29 DIAGNOSIS — Z79.899 USE OF MEDICATION WITH TERATOGENIC POTENTIAL IN FEMALE OF REPRODUCTIVE AGE: Primary | ICD-10-CM

## 2024-04-29 LAB
ABO GROUP BLD: NORMAL
ALBUMIN SERPL BCP-MCNC: 4.2 G/DL (ref 3.5–5)
ALP SERPL-CCNC: 42 U/L (ref 34–104)
ALT SERPL W P-5'-P-CCNC: 17 U/L (ref 7–52)
AMORPH URATE CRY URNS QL MICRO: ABNORMAL
ANION GAP SERPL CALCULATED.3IONS-SCNC: 10 MMOL/L (ref 4–13)
AST SERPL W P-5'-P-CCNC: 19 U/L (ref 13–39)
BACTERIA UR QL AUTO: ABNORMAL /HPF
BASOPHILS # BLD AUTO: 0.04 THOUSANDS/ÂΜL (ref 0–0.1)
BASOPHILS NFR BLD AUTO: 1 % (ref 0–1)
BILIRUB SERPL-MCNC: 0.37 MG/DL (ref 0.2–1)
BILIRUB UR QL STRIP: NEGATIVE
BLD GP AB SCN SERPL QL: NEGATIVE
BUN SERPL-MCNC: 13 MG/DL (ref 5–25)
CALCIUM SERPL-MCNC: 9.2 MG/DL (ref 8.4–10.2)
CHLORIDE SERPL-SCNC: 106 MMOL/L (ref 96–108)
CLARITY UR: CLEAR
CO2 SERPL-SCNC: 23 MMOL/L (ref 21–32)
COLOR UR: ABNORMAL
CREAT SERPL-MCNC: 0.59 MG/DL (ref 0.6–1.3)
EOSINOPHIL # BLD AUTO: 0.42 THOUSAND/ÂΜL (ref 0–0.61)
EOSINOPHIL NFR BLD AUTO: 5 % (ref 0–6)
ERYTHROCYTE [DISTWIDTH] IN BLOOD BY AUTOMATED COUNT: 13.4 % (ref 11.6–15.1)
EST. AVERAGE GLUCOSE BLD GHB EST-MCNC: 97 MG/DL
GFR SERPL CREATININE-BSD FRML MDRD: 128 ML/MIN/1.73SQ M
GLUCOSE 1H P 50 G GLC PO SERPL-MCNC: 109 MG/DL (ref 70–134)
GLUCOSE SERPL-MCNC: 110 MG/DL (ref 65–140)
GLUCOSE UR STRIP-MCNC: NEGATIVE MG/DL
HBA1C MFR BLD: 5 %
HBV SURFACE AG SER QL: NORMAL
HCT VFR BLD AUTO: 36.3 % (ref 34.8–46.1)
HCV AB SER QL: NORMAL
HGB BLD-MCNC: 12.1 G/DL (ref 11.5–15.4)
HGB UR QL STRIP.AUTO: NEGATIVE
HIV 1+2 AB+HIV1 P24 AG SERPL QL IA: NORMAL
HIV 2 AB SERPL QL IA: NORMAL
HIV1 AB SERPL QL IA: NORMAL
HIV1 P24 AG SERPL QL IA: NORMAL
IMM GRANULOCYTES # BLD AUTO: 0.04 THOUSAND/UL (ref 0–0.2)
IMM GRANULOCYTES NFR BLD AUTO: 1 % (ref 0–2)
KETONES UR STRIP-MCNC: NEGATIVE MG/DL
LEUKOCYTE ESTERASE UR QL STRIP: ABNORMAL
LYMPHOCYTES # BLD AUTO: 1.52 THOUSANDS/ÂΜL (ref 0.6–4.47)
LYMPHOCYTES NFR BLD AUTO: 18 % (ref 14–44)
MCH RBC QN AUTO: 28.3 PG (ref 26.8–34.3)
MCHC RBC AUTO-ENTMCNC: 33.3 G/DL (ref 31.4–37.4)
MCV RBC AUTO: 85 FL (ref 82–98)
MONOCYTES # BLD AUTO: 0.47 THOUSAND/ÂΜL (ref 0.17–1.22)
MONOCYTES NFR BLD AUTO: 6 % (ref 4–12)
MUCOUS THREADS UR QL AUTO: ABNORMAL
NEUTROPHILS # BLD AUTO: 6.04 THOUSANDS/ÂΜL (ref 1.85–7.62)
NEUTS SEG NFR BLD AUTO: 69 % (ref 43–75)
NITRITE UR QL STRIP: NEGATIVE
NON-SQ EPI CELLS URNS QL MICRO: ABNORMAL /HPF
NRBC BLD AUTO-RTO: 0 /100 WBCS
PH UR STRIP.AUTO: 6.5 [PH]
PLATELET # BLD AUTO: 202 THOUSANDS/UL (ref 149–390)
PMV BLD AUTO: 10.6 FL (ref 8.9–12.7)
POTASSIUM SERPL-SCNC: 3.7 MMOL/L (ref 3.5–5.3)
PROT SERPL-MCNC: 7 G/DL (ref 6.4–8.4)
PROT UR STRIP-MCNC: ABNORMAL MG/DL
RBC # BLD AUTO: 4.28 MILLION/UL (ref 3.81–5.12)
RBC #/AREA URNS AUTO: ABNORMAL /HPF
RH BLD: POSITIVE
RUBV IGG SERPL IA-ACNC: 56.5 IU/ML
SODIUM SERPL-SCNC: 139 MMOL/L (ref 135–147)
SP GR UR STRIP.AUTO: 1.02 (ref 1–1.03)
TREPONEMA PALLIDUM IGG+IGM AB [PRESENCE] IN SERUM OR PLASMA BY IMMUNOASSAY: NORMAL
UROBILINOGEN UR STRIP-ACNC: <2 MG/DL
WBC # BLD AUTO: 8.53 THOUSAND/UL (ref 4.31–10.16)
WBC #/AREA URNS AUTO: ABNORMAL /HPF

## 2024-04-29 PROCEDURE — 87340 HEPATITIS B SURFACE AG IA: CPT

## 2024-04-29 PROCEDURE — 86850 RBC ANTIBODY SCREEN: CPT

## 2024-04-29 PROCEDURE — 86803 HEPATITIS C AB TEST: CPT

## 2024-04-29 PROCEDURE — 86762 RUBELLA ANTIBODY: CPT

## 2024-04-29 PROCEDURE — 86901 BLOOD TYPING SEROLOGIC RH(D): CPT

## 2024-04-29 PROCEDURE — 81329 SMN1 GENE DOS/DELETION ALYS: CPT

## 2024-04-29 PROCEDURE — 82950 GLUCOSE TEST: CPT

## 2024-04-29 PROCEDURE — 36415 COLL VENOUS BLD VENIPUNCTURE: CPT

## 2024-04-29 PROCEDURE — 36415 COLL VENOUS BLD VENIPUNCTURE: CPT | Performed by: OBSTETRICS & GYNECOLOGY

## 2024-04-29 PROCEDURE — 81001 URINALYSIS AUTO W/SCOPE: CPT

## 2024-04-29 PROCEDURE — 99244 OFF/OP CNSLTJ NEW/EST MOD 40: CPT | Performed by: OBSTETRICS & GYNECOLOGY

## 2024-04-29 PROCEDURE — 87389 HIV-1 AG W/HIV-1&-2 AB AG IA: CPT

## 2024-04-29 PROCEDURE — 86780 TREPONEMA PALLIDUM: CPT

## 2024-04-29 PROCEDURE — 76813 OB US NUCHAL MEAS 1 GEST: CPT | Performed by: OBSTETRICS & GYNECOLOGY

## 2024-04-29 PROCEDURE — 86900 BLOOD TYPING SEROLOGIC ABO: CPT

## 2024-04-29 PROCEDURE — 87086 URINE CULTURE/COLONY COUNT: CPT

## 2024-04-29 NOTE — PROGRESS NOTES
OFFICE CONSULT  Referring physician:   Emily Pascual Pa-c  [address]  [address]  [place] [address]      Dear Emily Pascual      Thank you for requesting a  consultation on your patient Ms. Kassie Pitts for the following indications:  Genetic screening    History  Kassie is on prenatal vitamins and has a prescription for Advair, EpiPen and Zyrtec.  She reports allergies to erythromycin base.  Medical history includes eczema and allergies and mild intermittent asthma.  Surgery history includes knee arthroscopy and wisdom tooth extraction.  Her father has type 2 diabetes and her mother has history of 1 miscarriage.    Ultrasound findings: The ultrasound shows a fetus concordant with dates. The nasal bone and nuchal translucency appears normal. No malformations are seen on today's early ultrasound.     The patient was informed of the findings and counseled about the limitations of the exam in detecting all forms of fetal congenital abnormalities.    She does not report any vaginal bleeding or uterine cramping or contractions.      Specific counseling was provided on the following problems:  We discussed the options for genetic screening which include invasive testing on the fetal placenta or on fetal skin cells within the amniotic fluid and compared this to noninvasive testing which includes cell free DNA screening.  We reviewed the risks, the benefits and the limitations of each.  In the end patient chose to complete the cell free DNA screen.     Patient reports that for her eczema she is no longer taking Elocon or tacrolimus ointments.  She would like to restart her Dupixent injections every 2 weeks and is aware that the reviews of this medication have not shown significant studies in pregnancy. Dupilumab (Dupixent®) - MotherToBaby  She may consider restarting it once the fetus is formed which is after the first trimester.  If she chooses to start this medication, I would also encourage  her to be part of a study that follows outcomes for other patients who would like a more information about being on this medication during pregnancy. Join a [facility] Observational Pregnancy Study       Future tests recommended:  The results of her NIPT will return in 7-10 days.  Screening for spina bifida with an MSAFP screen is a future test that can be prescribed through her OB office.  This blood work should be drawn preferably at 16 to 18 weeks so that the results return prior to her next scan.  The test though can be run until 21 weeks and 6 days if needed.    Future ultrasounds ordered today:   Fetal Level II ultrasound imaging is recommended at 19-20 weeks' gestation.    Pre visit time reviewing her records   10 minutes  Face to face time 10 minutes  Post visit time on documentation of note, updating her problem list, adding orders and prescriptions 10 minutes.  Procedures that were completed today were charged separately.   The level of decision making was low level complexity.    Kae Khan MD     77.1

## 2024-04-29 NOTE — LETTER
2024     Emily Pascual PA-C  [address]  [address]  [place] [address]    Patient: Kassie Pitts   YOB: 2000   Date of Visit: 2024       Dear Dr. Pascual:    Thank you for referring Kassie Pitts to me for evaluation. Below are my notes for this consultation.    If you have questions, please do not hesitate to call me. I look forward to following your patient along with you.         Sincerely,        Kae Khan MD        CC: No Recipients    Kae Khan MD  2024  7:05 PM  Sign when Signing Visit  OFFICE CONSULT  Referring physician:   Emily Pascual Pa-c  [address]  [address] [facility]  [place] [address]      Dear Emily Pascual      Thank you for requesting a  consultation on your patient Ms. Kassie Pitts for the following indications:  Genetic screening    History  Kassie is on prenatal vitamins and has a prescription for Advair, EpiPen and Zyrtec.  She reports allergies to erythromycin base.  Medical history includes eczema and allergies and mild intermittent asthma.  Surgery history includes knee arthroscopy and wisdom tooth extraction.  Her father has type 2 diabetes and her mother has history of 1 miscarriage.    Ultrasound findings: The ultrasound shows a fetus concordant with dates. The nasal bone and nuchal translucency appears normal. No malformations are seen on today's early ultrasound.     The patient was informed of the findings and counseled about the limitations of the exam in detecting all forms of fetal congenital abnormalities.    She does not report any vaginal bleeding or uterine cramping or contractions.      Specific counseling was provided on the following problems:  We discussed the options for genetic screening which include invasive testing on the fetal placenta or on fetal skin cells within the amniotic fluid and compared this to noninvasive testing which includes cell free DNA screening.  We reviewed the  risks, the benefits and the limitations of each.  In the end patient chose to complete the cell free DNA screen.     Patient reports that for her eczema she is no longer taking Elocon or tacrolimus ointments.  She would like to restart her Dupixent injections every 2 weeks and is aware that the reviews of this medication have not shown significant studies in pregnancy. Dupilumab (Dupixent®) - MotherToBaby  She may consider restarting it once the fetus is formed which is after the first trimester.  If she chooses to start this medication, I would also encourage her to be part of a study that follows outcomes for other patients who would like a more information about being on this medication during pregnancy. Join a [facility] Observational Pregnancy Study       Future tests recommended:  The results of her NIPT will return in 7-10 days.  Screening for spina bifida with an MSAFP screen is a future test that can be prescribed through her OB office.  This blood work should be drawn preferably at 16 to 18 weeks so that the results return prior to her next scan.  The test though can be run until 21 weeks and 6 days if needed.    Future ultrasounds ordered today:   Fetal Level II ultrasound imaging is recommended at 19-20 weeks' gestation.    Pre visit time reviewing her records   10 minutes  Face to face time 10 minutes  Post visit time on documentation of note, updating her problem list, adding orders and prescriptions 10 minutes.  Procedures that were completed today were charged separately.   The level of decision making was low level complexity.    Kae Khan MD

## 2024-04-29 NOTE — LETTER
2024     Emily Pascual PA-C  [address]  [address]  [place] [address]    Patient: Kassie Pitts   YOB: 2000   Date of Visit: 2024       Dear Dr. Pascual:    Thank you for referring Kassie Pitts to me for evaluation. Below are my notes for this consultation.    If you have questions, please do not hesitate to call me. I look forward to following your patient along with you.         Sincerely,        Kae Khan MD        CC: No Recipients    Kae Khan MD  2024  7:04 PM  Sign when Signing Visit  OFFICE CONSULT  Referring physician:   Emily Pascual Pa-c  [address]  [address] [facility]  [place] [address]      Dear Emily Pascual      Thank you for requesting a  consultation on your patient Ms. Kassie Pitts for the following indications:  Genetic screening    History  Kassie is on prenatal vitamins and has a prescription for Advair, EpiPen and Zyrtec.  She reports allergies to erythromycin base.  Medical history includes eczema and allergies and mild intermittent asthma.  Surgery history includes knee arthroscopy and wisdom tooth extraction.  Her father has type 2 diabetes and her mother has history of 1 miscarriage.    Ultrasound findings: The ultrasound shows a fetus concordant with dates. The nasal bone and nuchal translucency appears normal. No malformations are seen on today's early ultrasound.     The patient was informed of the findings and counseled about the limitations of the exam in detecting all forms of fetal congenital abnormalities.    She does not report any vaginal bleeding or uterine cramping or contractions.      Specific counseling was provided on the following problems:  We discussed the options for genetic screening which include invasive testing on the fetal placenta or on fetal skin cells within the amniotic fluid and compared this to noninvasive testing which includes cell free DNA screening.  We reviewed the  risks, the benefits and the limitations of each.  In the end patient chose to complete the cell free DNA screen.     Patient reports that for her eczema she is no longer taking Elocon or tacrolimus ointments.  She would like to restart her Dupixent injections every 2 weeks and is aware that the reviews of this medication have not shown significant studies in pregnancy. Dupilumab (Dupixent®) - MotherToBaby  She may consider restarting it once the fetus is formed which is after the first trimester.  If she chooses to start this medication, I would also encourage her to be part of a study that follows outcomes for other patients who would like a more information about being on this medication during pregnancy. Join a [facility] Observational Pregnancy Study       Future tests recommended:  The results of her NIPT will return in 7-10 days.  Screening for spina bifida with an MSAFP screen is a future test that can be prescribed through her OB office.  This blood work should be drawn preferably at 16 to 18 weeks so that the results return prior to her next scan.  The test though can be run until 21 weeks and 6 days if needed.    Future ultrasounds ordered today:   Fetal Level II ultrasound imaging is recommended at 19-20 weeks' gestation.    Pre visit time reviewing her records   10 minutes  Face to face time 10 minutes  Post visit time on documentation of note, updating her problem list, adding orders and prescriptions 10 minutes.  Procedures that were completed today were charged separately.   The level of decision making was low level complexity.    Kae Khan MD

## 2024-04-29 NOTE — PROGRESS NOTES
Patient chose to have [facility] [id number] Non-Invasive Prenatal Screen [id number] PLUS w/ SCA, WITH fetal sex.  Patient choose to be billed through insurance.     Patient given brochure and is aware [facility] will contact patient's insurance and coordinate coverage.  Provided [facility] contact information. General inquiries [phone], Cost estimates [phone] and [facility] Billing [phone]. Website [email or web address].     Blood collection tubes labeled with patient identifiers (name, medical record number, and date of birth).     Filled out [facility] order form. Patient chose to have blood drawn in our office at time of visit. NIPS was drawn from right arm with a butterfly needle by DACIA Espinoza [facility] . .        Maternal Fetal Medicine will have results in approximately 5-7 business days and will call patient or notify via [employer].  Patient aware viewing lab result online will reveal fetal sex if ordered.    Patient verbalized understanding of all instructions and no questions at this time.

## 2024-05-01 ENCOUNTER — INITIAL PRENATAL (OUTPATIENT)
Dept: OBGYN CLINIC | Facility: CLINIC | Age: 24
End: 2024-05-01

## 2024-05-01 VITALS
SYSTOLIC BLOOD PRESSURE: 100 MMHG | WEIGHT: 141.4 LBS | DIASTOLIC BLOOD PRESSURE: 60 MMHG | HEIGHT: 63 IN | BODY MASS INDEX: 25.05 KG/M2

## 2024-05-01 DIAGNOSIS — Z34.01 ENCOUNTER FOR SUPERVISION OF NORMAL FIRST PREGNANCY IN FIRST TRIMESTER: ICD-10-CM

## 2024-05-01 DIAGNOSIS — Z34.91 PRENATAL CARE IN FIRST TRIMESTER: ICD-10-CM

## 2024-05-01 DIAGNOSIS — T78.2XXA ANAPHYLAXIS, INITIAL ENCOUNTER: ICD-10-CM

## 2024-05-01 DIAGNOSIS — Z3A.12 12 WEEKS GESTATION OF PREGNANCY: ICD-10-CM

## 2024-05-01 DIAGNOSIS — Z11.3 SCREENING FOR STD (SEXUALLY TRANSMITTED DISEASE): Primary | ICD-10-CM

## 2024-05-01 DIAGNOSIS — Z12.4 SCREENING FOR CERVICAL CANCER: ICD-10-CM

## 2024-05-01 PROBLEM — M25.569 KNEE PAIN: Status: RESOLVED | Noted: 2020-01-17 | Resolved: 2024-05-01

## 2024-05-01 PROBLEM — T78.05XA ANAPHYLAXIS DUE TO TREE NUT: Status: RESOLVED | Noted: 2020-01-19 | Resolved: 2024-05-01

## 2024-05-01 PROBLEM — H01.131: Status: RESOLVED | Noted: 2020-01-19 | Resolved: 2024-05-01

## 2024-05-01 PROBLEM — R21 FACIAL RASH: Status: RESOLVED | Noted: 2020-06-30 | Resolved: 2024-05-01

## 2024-05-01 PROBLEM — H01.132: Status: RESOLVED | Noted: 2020-01-19 | Resolved: 2024-05-01

## 2024-05-01 LAB — BACTERIA UR CULT: NORMAL

## 2024-05-01 PROCEDURE — PNV: Performed by: PHYSICIAN ASSISTANT

## 2024-05-01 PROCEDURE — 87591 N.GONORRHOEAE DNA AMP PROB: CPT | Performed by: PHYSICIAN ASSISTANT

## 2024-05-01 PROCEDURE — G0145 SCR C/V CYTO,THINLAYER,RESCR: HCPCS | Performed by: PHYSICIAN ASSISTANT

## 2024-05-01 PROCEDURE — 87491 CHLMYD TRACH DNA AMP PROBE: CPT | Performed by: PHYSICIAN ASSISTANT

## 2024-05-01 RX ORDER — EPINEPHRINE 0.3 MG/.3ML
0.3 INJECTION SUBCUTANEOUS ONCE
Qty: 0.3 ML | Refills: 1 | Status: SHIPPED | OUTPATIENT
Start: 2024-05-01 | End: 2024-05-01

## 2024-05-01 NOTE — PROGRESS NOTES
Kassie Pitts is here for her first prenatal visit  Age: 23 y.o.  LMP: Patient's last menstrual period was 2024 (exact date).    Gestational age 12w3d based on LMP, consistent w/ 1st trimester US.    1  Para 0             She denies nausea/vomiting and bleeding/cramping.     Prenatal labs:   A positive, antibody neg   CBC: 12.1/36.3<202  Hep B/HepC/HIV/Syphilis - nonreactive   Rubella - immune   UA/UC - wnl    CMP normal   1 hr gt - 109     NIPT - pending   NT scan - completed 24   Level II scheduled 2024    GYN history:   Baseline pap smear done today.   Denies history of STDs.     Allergies: Nuts - food allergy, Erythromycin, Erythromycin base, and Red dye - food allergy  Medication use :     Current Outpatient Medications:     cetirizine (ZyrTEC) 10 mg tablet, Take 10 mg by mouth daily, Disp: , Rfl:     Dupixent 300 MG/2ML SOPN, Inject 300 mg under the skin every 14 (fourteen) days, Disp: 4 mL, Rfl: 11    EPINEPHrine (EPIPEN) 0.3 mg/0.3 mL SOAJ, Inject 0.3 mL (0.3 mg total) into a muscle once for 1 dose, Disp: 0.3 mL, Rfl: 1    fluticasone-salmeterol (ADVAIR HFA) 230-21 MCG/ACT inhaler, Inhale 2 puffs 2 (two) times a day Rinse mouth after use., Disp: 36 g, Rfl: 3    Prenatal Vit-Fe Fumarate-FA (Prenatal Vitamin) 27-0.8 MG TABS, Take by mouth, Disp: , Rfl:      Social history: Denies tobacco, alcohol, or illicit drug use.  In this pregnancy her  medical history is significant for     Eczema, allergies, asthma -- well controlled on dupixent. Medication use in pregnancy reviewed and plans to continue. Given information to enter trial but declines.     Her obstetrical, medical, surgical and family history was reviewed    Her physical exam was normal    Fetal heart tones: 155    A Pap smear was obtained, Gonorrhea and Chlamydia testing was obtained    Discussed as well during this visit was diet, prenatal vitamins, prenatal visits, lab testing, breast feeding, vaccinations, maternal fetal  medicine consultations, and lifestyle.    ASSESSMENT/PLAN   Problem List Items Addressed This Visit       12 weeks gestation of pregnancy     First OB labs - completed, normal. A positive   Gc/chlamydia -collected 5/1  Pap - collected 5/1   Blue folder - has     NIPT - pending  AFP - reviewed will order at next visit   NT scan - completed 4/29/24   Level II scheduled 6/24/2024          Other Visit Diagnoses       Screening for STD (sexually transmitted disease)    -  Primary    Relevant Orders    Chlamydia/GC amplified DNA by PCR    Screening for cervical cancer        Relevant Orders    Liquid-based pap, screening    Encounter for supervision of normal first pregnancy in first trimester        Anaphylaxis, initial encounter        Relevant Medications    EPINEPHrine (EPIPEN) 0.3 mg/0.3 mL SOAJ    Prenatal care in first trimester                Blue packet given and reviewed     All questions were answered & Joelyn expressed understanding.

## 2024-05-01 NOTE — ASSESSMENT & PLAN NOTE
First OB labs - completed, normal. A positive   Gc/chlamydia -collected 5/1  Pap - collected 5/1   Blue folder - has     NIPT - pending  AFP - reviewed will order at next visit   NT scan - completed 4/29/24   Level II scheduled 6/24/2024

## 2024-05-03 LAB
C TRACH DNA SPEC QL NAA+PROBE: NEGATIVE
CITATION REF LAB TEST: NORMAL
CLINICAL INFO: NORMAL
ETHNIC BACKGROUND STATED: NORMAL
GENE DIS ANL CARRIER INTERP-IMP: NORMAL
GENE MUT TESTED BLD/T: NORMAL
LAB DIRECTOR NAME PROVIDER: NORMAL
N GONORRHOEA DNA SPEC QL NAA+PROBE: NEGATIVE
REASON FOR REFERRAL (NARRATIVE): NORMAL
RECOMMENDATION PATIENT DOC-IMP: NORMAL
REF LAB TEST METHOD: NORMAL
SERVICE CMNT-IMP: NORMAL
SMN1 GENE MUT ANL BLD/T: NORMAL
SPECIMEN SOURCE: NORMAL

## 2024-05-04 LAB
CFDNA.FET/CFDNA.TOTAL SFR FETUS: NORMAL %
CITATION REF LAB TEST: NORMAL
FET 13+18+21+X+Y ANEUP PLAS.CFDNA: NEGATIVE
FET CHR 21 TS PLAS.CFDNA QL: NEGATIVE
FET CHR 21 TS PLAS.CFDNA QL: NEGATIVE
FET MS X RISK WBC.DNA+CFDNA QL: NOT DETECTED
FET SEX PLAS.CFDNA DOSAGE CFDNA: NORMAL
FET TS 13 RISK PLAS.CFDNA QL: NEGATIVE
FET X + Y ANEUP RISK PLAS.CFDNA SEQ-IMP: NOT DETECTED
GA EST FROM CONCEPTION DATE: NORMAL D
GESTATIONAL AGE > 9:: YES
LAB DIRECTOR NAME PROVIDER: NORMAL
LAB DIRECTOR NAME PROVIDER: NORMAL
LABORATORY COMMENT REPORT: NORMAL
LIMITATIONS OF THE TEST: NORMAL
NEGATIVE PREDICTIVE VALUE: NORMAL
PERFORMANCE CHARACTERISTICS: NORMAL
POSITIVE PREDICTIVE VALUE: NORMAL
REF LAB TEST METHOD: NORMAL
SL AMB NOTE:: NORMAL
TEST PERFORMANCE INFO SPEC: NORMAL

## 2024-05-05 NOTE — RESULT ENCOUNTER NOTE
Ms. Kassie Pitts   Your Cell free DNA screening returned as normal.  If you are interested in knowing what the baby's sex is, than you will need to open the lab result to see it.     Your obstetrician at your next OB visit should offer screening for spina bifida that can be completed between 16 and 18 weeks and utilizes the blood test called MSAFP. This lab is to see if your baby is at increased risk to have spinal defect.    Kae Khan MD

## 2024-05-09 LAB
LAB AP GYN PRIMARY INTERPRETATION: NORMAL
LAB AP LMP: NORMAL
Lab: NORMAL
PATH INTERP SPEC-IMP: NORMAL

## 2024-05-13 LAB
CFTR FULL MUT ANL BLD/T SEQ: NORMAL
CITATION REF LAB TEST: NORMAL
CLINICAL INFO: NORMAL
ETHNIC BACKGROUND STATED: NORMAL
GENE DIS ANL CARRIER INTERP-IMP: NORMAL
INDICATION: NORMAL
LAB DIRECTOR NAME PROVIDER: NORMAL
RECOMMENDATION PATIENT DOC-IMP: NORMAL
REF LAB TEST METHOD: NORMAL
SERVICE CMNT-IMP: NORMAL
SPECIMEN SOURCE: NORMAL

## 2024-06-05 ENCOUNTER — ROUTINE PRENATAL (OUTPATIENT)
Dept: OBGYN CLINIC | Facility: CLINIC | Age: 24
End: 2024-06-05
Payer: COMMERCIAL

## 2024-06-05 VITALS
DIASTOLIC BLOOD PRESSURE: 68 MMHG | BODY MASS INDEX: 26.19 KG/M2 | SYSTOLIC BLOOD PRESSURE: 104 MMHG | HEIGHT: 63 IN | WEIGHT: 147.8 LBS

## 2024-06-05 DIAGNOSIS — Z3A.17 17 WEEKS GESTATION OF PREGNANCY: ICD-10-CM

## 2024-06-05 DIAGNOSIS — M54.9 BACK PAIN IN PREGNANCY: ICD-10-CM

## 2024-06-05 DIAGNOSIS — J45.20 MILD INTERMITTENT ASTHMA WITHOUT COMPLICATION: ICD-10-CM

## 2024-06-05 DIAGNOSIS — Z34.02 PRENATAL CARE, FIRST PREGNANCY IN SECOND TRIMESTER: Primary | ICD-10-CM

## 2024-06-05 DIAGNOSIS — O99.891 BACK PAIN IN PREGNANCY: ICD-10-CM

## 2024-06-05 DIAGNOSIS — Z36.9 UNSPECIFIED ANTENATAL SCREENING: ICD-10-CM

## 2024-06-05 DIAGNOSIS — Z33.1 PREGNANT STATE, INCIDENTAL: ICD-10-CM

## 2024-06-05 DIAGNOSIS — T78.2XXA ANAPHYLAXIS, INITIAL ENCOUNTER: ICD-10-CM

## 2024-06-05 LAB
SL AMB  POCT GLUCOSE, UA: NORMAL
SL AMB POCT URINE PROTEIN: NORMAL

## 2024-06-05 PROCEDURE — PNV: Performed by: PHYSICIAN ASSISTANT

## 2024-06-05 PROCEDURE — 81002 URINALYSIS NONAUTO W/O SCOPE: CPT | Performed by: PHYSICIAN ASSISTANT

## 2024-06-05 RX ORDER — EPINEPHRINE 0.3 MG/.3ML
0.3 INJECTION SUBCUTANEOUS ONCE
Qty: 0.3 ML | Refills: 1 | Status: SHIPPED | OUTPATIENT
Start: 2024-06-05 | End: 2024-06-05

## 2024-06-05 NOTE — PROGRESS NOTES
17 weeks gestation of pregnancy  Kassie  is a 23 y.o.  @17w3d who presents for routine prenatal visit.    Denies OB complaints.     NIPT- low risk  MASFP- orders placed and reviewed test timing  Level II scheduled    Not yet feeling fetal movement.  Denies contractions, cramping, leakage of fluid or vaginal bleeding.     Reviewed PTL precautions and reasons to call.        Mild intermittent asthma  Advair PRN

## 2024-06-05 NOTE — ASSESSMENT & PLAN NOTE
Kassie  is a 23 y.o.  @17w3d who presents for routine prenatal visit.    Denies OB complaints.     NIPT- low risk  MASFP- orders placed and reviewed test timing  Level II scheduled    Not yet feeling fetal movement.  Denies contractions, cramping, leakage of fluid or vaginal bleeding.     Reviewed PTL precautions and reasons to call.

## 2024-06-10 ENCOUNTER — TELEPHONE (OUTPATIENT)
Dept: OBGYN CLINIC | Facility: CLINIC | Age: 24
End: 2024-06-10

## 2024-06-14 ENCOUNTER — APPOINTMENT (OUTPATIENT)
Dept: LAB | Facility: CLINIC | Age: 24
End: 2024-06-14
Payer: COMMERCIAL

## 2024-06-14 DIAGNOSIS — Z33.1 PREGNANT STATE, INCIDENTAL: ICD-10-CM

## 2024-06-14 DIAGNOSIS — Z36.9 UNSPECIFIED ANTENATAL SCREENING: ICD-10-CM

## 2024-06-14 PROCEDURE — 36415 COLL VENOUS BLD VENIPUNCTURE: CPT

## 2024-06-14 PROCEDURE — 82105 ALPHA-FETOPROTEIN SERUM: CPT

## 2024-06-17 NOTE — PROGRESS NOTES
PT Evaluation     Today's date: 2024  Patient name: Kassie Pitts  : 2000  MRN: [id number]  Referring provider: Emily Araujo PA-C  Dx:   Encounter Diagnosis     ICD-10-CM    1. Back pain in pregnancy  O99.891 Ambulatory Referral to Physical Therapy    M54.9           Start Time: 1145  Stop Time: 1230  Total time in clinic (min): 45 minutes    Assessment  Impairments: abnormal or restricted ROM, impaired physical strength and lacks appropriate home exercise program    Assessment details: Patient is a 23 y.o. female  19 weeks pregnant with c/o of thoracic and lumbar pain. 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 On exam patient presents with spinal mobility deficits and strength deficits. Patient will benefit from skilled PT to address the above impairments to improve her comfort through her pregnancy and educate patient regarding labor prep.   Understanding of Dx/Px/POC: good     Prognosis: good    Goals  Within 10 weeks,   1. Patient will demonstrate proper TrA contraction.   2. Patient will demonstrate proper diaphragmatic breathing.  3. Patient will be independent with her HEP to manage her spinal sx.  4. Patient will be able to report <4/10 spinal pain at end of her work shift.   5. Patient will report no urinary leakage with coughing/laughing/sneezing.    Within 20 weeks or by discharge,   1. Patient will demonstrate proper pelvic floor muscle contraction and relaxation.   2. Patient will be able to demonstrate good understanding of various laboring positions to encourage labor.  3. Patient will be independent with pain management strategies which can include exercise, stretching, use of maternity support belt, etc.       Plan  Patient would benefit from: skilled physical therapy  Planned modality interventions: cryotherapy and thermotherapy: hydrocollator packs    Planned therapy interventions: abdominal trunk stabilization, joint mobilization, manual  therapy, activity modification, balance, balance/weight bearing training, neuromuscular re-education, body mechanics training, postural training, patient education, therapeutic activities, therapeutic exercise, functional ROM exercises, strengthening, stretching, transfer training, gait training, home exercise program and breathing training    Frequency: 1-2x/week.  Plan of Care beginning date: 2024  Plan of Care expiration date: 2024  Treatment plan discussed with: patient      PT Pelvic Floor Subjective:   History of Present Illness:   Patient is 19 weeks pregnant, ALLISON 11/10/24. Birth plan: open    C/o having thoracic pain now that her breasts are larger. Also has lower back pain that can cause sciatic pain. The sciatic pain can feel like a sharp pain when she steps.     Aggravating factors: working as a RN, being on feet  Ease factors: stretching    First trimester: was very fatigued    Social Support:     Lives in:  Multiple-level home    Lives with:  Spouse    Relationship status: /committed    Work status: employed full time (RN at [employer])  Diet and Exercise:    Diet:balanced nutrition    Walking/stretching - no issues    Pre-pregnancy: dumbbell + machines  OB/ gyn History    Gestational History:     Number of prior pregnancies: 1  Bladder Function:      Voiding Difficulties comments:     Voiding frequency: every 1-2 hours    Urinary leakage: urine leakage    Urinary leakage aggravated by: sneezing    Nocturia (episodes per night): 0    Intake (ounces):     Intake (ounces) comment: 64 oz x 2 = 128 oz  Bowel Function:     Bowel frequency: daily  Sexual Function:     Sexually Active:  Sexually active  Pain:     Current pain ratin    At best pain ratin    At worst pain ratin    Objective     Active Range of Motion   Cervical/Thoracic Spine       Thoracic    Flexion:  Restriction level: minimal  Extension:  Restriction level: minimal    Lumbar   Flexion:  Restriction level:  minimal  Extension:  Restriction level: minimal  Left lateral flexion:  Restriction level: minimal  Right lateral flexion:  Restriction level: minimal    Tests     Lumbar     Left   Negative slump test.     Right   Negative slump test.              POC expires Unit limit Auth Expiration date PT/OT/ST + Visit Limit?   11/7/24 6/5/25                              Visit/Unit Tracking  AUTH Status:  Date 6/20             Pending Used 1              Remaining                 Diagnosis: Thoracic pain + lumbar pain during pregnancy   Precautions: Pregnant   Insurance: [facility]    6/20          Vitals 112/68 *pelvic floor exam  *hip, spinal strengthening   FOTO      Patient Ed                                                                                        Neuro Re-Ed           Bridges + DB + TrA + PFM  3 x 8-15                                                                            Ther Ex           Aerobic conditioning           Spinal stretches LTR  Cat-cow  Thread the needle    Open book          Pball stretches Hip circles    1/2 kneel lunge          No Money GTB  3 x 10                                                      Ther Activity                                 Manual                                                                  Modalities

## 2024-06-18 LAB
2ND TRIMESTER 4 SCREEN SERPL-IMP: NORMAL
AFP ADJ MOM SERPL: 1.44
AFP INTERP AMN-IMP: NORMAL
AFP INTERP SERPL-IMP: NORMAL
AFP INTERP SERPL-IMP: NORMAL
AFP SERPL-MCNC: 70.7 NG/ML
AGE AT DELIVERY: 25 YR
GA METHOD: NORMAL
GA: 18.7 WEEKS
IDDM PATIENT QL: NO
MULTIPLE PREGNANCY: NO
NEURAL TUBE DEFECT RISK FETUS: 3220 %

## 2024-06-20 ENCOUNTER — EVALUATION (OUTPATIENT)
Dept: PHYSICAL THERAPY | Facility: REHABILITATION | Age: 24
End: 2024-06-20
Payer: COMMERCIAL

## 2024-06-20 DIAGNOSIS — O99.891 BACK PAIN IN PREGNANCY: ICD-10-CM

## 2024-06-20 DIAGNOSIS — M54.9 BACK PAIN IN PREGNANCY: ICD-10-CM

## 2024-06-20 PROCEDURE — 97110 THERAPEUTIC EXERCISES: CPT | Performed by: PHYSICAL THERAPIST

## 2024-06-20 PROCEDURE — 97162 PT EVAL MOD COMPLEX 30 MIN: CPT | Performed by: PHYSICAL THERAPIST

## 2024-06-24 ENCOUNTER — TELEPHONE (OUTPATIENT)
Dept: OBGYN CLINIC | Facility: CLINIC | Age: 24
End: 2024-06-24

## 2024-06-24 ENCOUNTER — ROUTINE PRENATAL (OUTPATIENT)
Dept: PERINATAL CARE | Facility: CLINIC | Age: 24
End: 2024-06-24
Payer: COMMERCIAL

## 2024-06-24 VITALS
SYSTOLIC BLOOD PRESSURE: 98 MMHG | DIASTOLIC BLOOD PRESSURE: 64 MMHG | BODY MASS INDEX: 26.9 KG/M2 | HEIGHT: 63 IN | HEART RATE: 80 BPM | WEIGHT: 151.8 LBS

## 2024-06-24 DIAGNOSIS — Z36.89 ENCOUNTER FOR FETAL ANATOMIC SURVEY: ICD-10-CM

## 2024-06-24 DIAGNOSIS — Z36.86 ENCOUNTER FOR ANTENATAL SCREENING FOR CERVICAL LENGTH: ICD-10-CM

## 2024-06-24 DIAGNOSIS — Z3A.20 20 WEEKS GESTATION OF PREGNANCY: Primary | ICD-10-CM

## 2024-06-24 PROCEDURE — 99213 OFFICE O/P EST LOW 20 MIN: CPT | Performed by: OBSTETRICS & GYNECOLOGY

## 2024-06-24 PROCEDURE — 76817 TRANSVAGINAL US OBSTETRIC: CPT | Performed by: OBSTETRICS & GYNECOLOGY

## 2024-06-24 PROCEDURE — 76805 OB US >/= 14 WKS SNGL FETUS: CPT | Performed by: OBSTETRICS & GYNECOLOGY

## 2024-06-24 NOTE — LETTER
"2024     Meri Muñoz DO  [address]  [address]  [place] [address]    Patient: Kassie Pitts   YOB: 2000   Date of Visit: 2024       Dear Dr. Muñoz:    Thank you for referring Kassie Pitts to me for evaluation. Below are my notes for this consultation.    If you have questions, please do not hesitate to call me. I look forward to following your patient along with you.         Sincerely,        Abida Rajput MD        CC: No Recipients    Abida Rajput MD  2024 11:04 AM  Sign when Signing Visit  [facility]: Kassie Pitts was seen today at 20w1d for anatomic survey and cervical length screening ultrasound.  See ultrasound report under \"OB Procedures\" tab.  Please don't hesitate to contact our office with any concerns or questions.  -Abida Rajput MD         "

## 2024-06-24 NOTE — PROGRESS NOTES
"[facility]: Kassie Pitts was seen today at 20w1d for anatomic survey and cervical length screening ultrasound.  See ultrasound report under \"OB Procedures\" tab.  Please don't hesitate to contact our office with any concerns or questions.  -Abida Rajput MD      "

## 2024-06-24 NOTE — TELEPHONE ENCOUNTER
Called the patient for a 2nd trimester call, left a voice message to return the call, also sent a message via my chart:  .Stew Fernando,    It's time for our 2nd trimester call! I will be reaching out next week to complete our check-in. My number may come up as spam and/or healthcare depending on your phone carrier settings. I have attached a Depression Questionnaire if you could please fill this as soon as you can prior to our phone call, I'd greatly appreciate it!     Please let me know if it is easier to have this check-in via [employer], I will gladly message you my questions if this works better for you.       Thank you,  JAYNE Wheeler  OB Navigator   [facility]

## 2024-06-24 NOTE — PROGRESS NOTES
Ultrasound Probe Disinfection    A transvaginal ultrasound was performed.   Prior to use, disinfection was performed with High Level Disinfection Process ([employer]).  Probe serial number [id number]: [id number] was used.      Maty Pena  06/24/24  10:14 AM

## 2024-06-25 ENCOUNTER — OFFICE VISIT (OUTPATIENT)
Dept: PHYSICAL THERAPY | Facility: REHABILITATION | Age: 24
End: 2024-06-25
Payer: COMMERCIAL

## 2024-06-25 DIAGNOSIS — M54.9 BACK PAIN IN PREGNANCY: Primary | ICD-10-CM

## 2024-06-25 DIAGNOSIS — O99.891 BACK PAIN IN PREGNANCY: Primary | ICD-10-CM

## 2024-06-25 PROCEDURE — 97110 THERAPEUTIC EXERCISES: CPT | Performed by: PHYSICAL THERAPIST

## 2024-06-25 PROCEDURE — 97530 THERAPEUTIC ACTIVITIES: CPT | Performed by: PHYSICAL THERAPIST

## 2024-06-25 PROCEDURE — 97750 PHYSICAL PERFORMANCE TEST: CPT | Performed by: PHYSICAL THERAPIST

## 2024-06-25 NOTE — PROGRESS NOTES
Daily Note     Today's date: 2024  Patient name: Kassie Pitts  : 2000  MRN: [id number]  Referring provider: Emily Araujo PA-C  Dx:   Encounter Diagnosis     ICD-10-CM    1. Back pain in pregnancy  O99.891     M54.9           Start Time: 1630  Stop Time: 1715  Total time in clinic (min): 45 minutes    Subjective: Feeling good after last PT session. Exercises going well.       Objective: See treatment diary below  Objective  External Pelvic Exam  Visualization - patient demonstrating proper motor control with pelvic floor contraction and relaxation. However does not appear to be full range of motion for relaxation. Contraction appears to be weaker contraction.     Assessment: Patient is 20 weeks pregnant. Evaluated patient's pelvic floor externally in which patient demonstrated proper control but likely some weakness and not full relaxation. Incorporated pelvic floor contraction with her strength exercises. Patient demonstrated fatigue post treatment, exhibited good technique with therapeutic exercises, and would benefit from continued PT to improve her comfort through the remainder of her pregnancy.      Plan: Continue per plan of care.      POC expires Unit limit Auth Expiration date PT/OT/ST + Visit Limit?   24                              Visit/Unit Tracking  AUTH Status:  Date             Approved Used 1 2             Remaining                 Diagnosis: Thoracic pain + lumbar pain during pregnancy, baby boy    Precautions: Pregnant   Insurance: [facility]             Vitals 112/68    FOTO      Patient Ed           Pelvic floor anatomy  Educated         Pelvic floor exam  Completed                                                                Neuro Re-Ed           Bridges + DB + TrA + PFM  3 x 8-15 + DB + TrA + PFM  8x                                                                           Ther Ex           Aerobic conditioning  TM 3 mph  5 min          Spinal stretches LTR  Cat-cow  Thread the needle    Open book          Pball stretches Hip circles    1/2 kneel lunge          No Money GTB  3 x 10          Clams  2 x 10 ea         Sit to stands  2 x 10                               Ther Activity                                 Manual                                                                  Modalities

## 2024-07-03 ENCOUNTER — APPOINTMENT (OUTPATIENT)
Dept: PHYSICAL THERAPY | Facility: REHABILITATION | Age: 24
End: 2024-07-03
Payer: COMMERCIAL

## 2024-07-05 ENCOUNTER — OFFICE VISIT (OUTPATIENT)
Dept: PHYSICAL THERAPY | Facility: REHABILITATION | Age: 24
End: 2024-07-05
Payer: COMMERCIAL

## 2024-07-05 ENCOUNTER — ROUTINE PRENATAL (OUTPATIENT)
Dept: OBGYN CLINIC | Facility: CLINIC | Age: 24
End: 2024-07-05
Payer: COMMERCIAL

## 2024-07-05 VITALS — SYSTOLIC BLOOD PRESSURE: 118 MMHG | WEIGHT: 153.6 LBS | BODY MASS INDEX: 27.21 KG/M2 | DIASTOLIC BLOOD PRESSURE: 64 MMHG

## 2024-07-05 DIAGNOSIS — Z34.02 PRENATAL CARE, FIRST PREGNANCY IN SECOND TRIMESTER: ICD-10-CM

## 2024-07-05 DIAGNOSIS — Z3A.21 21 WEEKS GESTATION OF PREGNANCY: Primary | ICD-10-CM

## 2024-07-05 DIAGNOSIS — O99.891 BACK PAIN IN PREGNANCY: Primary | ICD-10-CM

## 2024-07-05 DIAGNOSIS — M54.9 BACK PAIN IN PREGNANCY: Primary | ICD-10-CM

## 2024-07-05 LAB
SL AMB  POCT GLUCOSE, UA: NORMAL
SL AMB POCT URINE PROTEIN: NORMAL

## 2024-07-05 PROCEDURE — 97110 THERAPEUTIC EXERCISES: CPT

## 2024-07-05 PROCEDURE — 81002 URINALYSIS NONAUTO W/O SCOPE: CPT | Performed by: OBSTETRICS & GYNECOLOGY

## 2024-07-05 PROCEDURE — PNV: Performed by: OBSTETRICS & GYNECOLOGY

## 2024-07-05 NOTE — PROGRESS NOTES
Daily Note     Today's date: 2024  Patient name: Kassie Pitts  : 2000  MRN: [id number]  Referring provider: Emily Araujo PA-C  Dx:   Encounter Diagnosis     ICD-10-CM    1. Back pain in pregnancy  O99.891     M54.9           Start Time: 1345  Stop Time: 1440  Total time in clinic (min): 55 minutes    Subjective: Pt feels good, continues to do the exercises.      Objective: See treatment diary below      Assessment: Tolerated treatment well. Patient would benefit from continued PT. Started implementating more strengthening exercises so she can return to lifting weights. Able to maintaing good breathing pattern t/o exercises, did feel adding the breathing to the reverse lunges added stability to the movement.      Plan: Continue per plan of care.      POC expires Unit limit Auth Expiration date PT/OT/ST + Visit Limit?   24                              Visit/Unit Tracking  AUTH Status:  Date            Approved Used 1 2 3            Remaining                 Diagnosis: Thoracic pain + lumbar pain during pregnancy, baby boy    Precautions: Pregnant   Insurance: [employer]            Vitals 112/68  100/70  FOTO      Patient Ed           Pelvic floor anatomy  Educated         Pelvic floor exam  Completed                                                                Neuro Re-Ed           Bridges + DB + TrA + PFM  3 x 8-15 + DB + TrA + PFM  8x                                                                           Ther Ex           Aerobic conditioning  TM 3 mph  5 min Bike L5 8'        Spinal stretches LTR  Cat-cow  Thread the needle    Open book  Thread the needle 5x  Q-ped hip hinge 10x        Pball stretches Hip circles    1/2 kneel lunge          No Money GTB  3 x 10          Clams  2 x 10 ea         Sit to stands  2 x 10 2x10 10#        Shoulder rows/chest press / multifidus   YCO 2x10        UE strength   Bicep curls 7#2x10  Triceps kickbacks  6#  Chest fly 2x10 6#        LE strength   Squats 10# KB 20x  Reverse lunges w/ no weights        Ther Activity                                 Manual                                                                  Modalities

## 2024-07-05 NOTE — PROGRESS NOTES
Prenatal visit 21w5d  Denies cramping, bleeding, leakage of fluid. Has felt some fetal movement.  Pap 5/1/24 negative  GC/CT: 5/1/24 negative  PN1 Labs: 4/29/24  Blood Type: A positive  MFM Level 1: 4/29/24  MFM Level 2: 6/24/24  SMA/CF: 4/29/24 negative  AFP: 6/14/24 negative  Blue Folder: given

## 2024-07-05 NOTE — ASSESSMENT & PLAN NOTE
Pt doing well today, no complaints.  Feeling FM. Denies ctx, vb and lof.   Notes some increased vaginal discharge- white and thin. Doesn't seem like ROM. No irritation, itching or odor. Suspect normal pregnancy related discharge- reviewed in detail.  Up to date on care  Sp normal anatomy scan   Precautions reviewed. F/u in 4 weeks.

## 2024-07-05 NOTE — PROGRESS NOTES
Problem List Items Addressed This Visit       21 weeks gestation of pregnancy - Primary     Pt doing well today, no complaints.  Feeling FM. Denies ctx, vb and lof.   Notes some increased vaginal discharge- white and thin. Doesn't seem like ROM. No irritation, itching or odor. Suspect normal pregnancy related discharge- reviewed in detail.  Up to date on care  Sp normal anatomy scan   Precautions reviewed. F/u in 4 weeks.           Other Visit Diagnoses       Prenatal care, first pregnancy in second trimester        Relevant Orders    POCT urine dip (Completed)            Kassie Pitts is a 23 y.o.  at 21w5d who presents today for routine prenatal visit.     /64 (BP Location: Left arm, Patient Position: Sitting, Cuff Size: Standard)   Wt 69.7 kg (153 lb 9.6 oz)   LMP 2024 (Exact Date)   BMI 27.21 kg/m²

## 2024-07-06 ENCOUNTER — HOSPITAL ENCOUNTER (EMERGENCY)
Facility: HOSPITAL | Age: 24
Discharge: HOME | End: 2024-07-06
Attending: EMERGENCY MEDICINE
Payer: COMMERCIAL

## 2024-07-06 VITALS
TEMPERATURE: 98.2 F | RESPIRATION RATE: 18 BRPM | SYSTOLIC BLOOD PRESSURE: 119 MMHG | OXYGEN SATURATION: 98 % | HEART RATE: 82 BPM | DIASTOLIC BLOOD PRESSURE: 56 MMHG
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
## 2024-07-06 DIAGNOSIS — Z3A.21 21 WEEKS GESTATION OF PREGNANCY: ICD-10-CM

## 2024-07-06 PROCEDURE — 76815 OB US LIMITED FETUS(S): CPT | Performed by: EMERGENCY MEDICINE

## 2024-07-06 PROCEDURE — 99284 EMERGENCY DEPT VISIT MOD MDM: CPT | Performed by: EMERGENCY MEDICINE

## 2024-07-06 PROCEDURE — 99284 EMERGENCY DEPT VISIT MOD MDM: CPT
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
## 2024-07-10 ENCOUNTER — APPOINTMENT (OUTPATIENT)
Dept: PHYSICAL THERAPY | Facility: REHABILITATION | Age: 24
End: 2024-07-10
Payer: COMMERCIAL

## 2024-07-10 ENCOUNTER — ROUTINE PRENATAL (OUTPATIENT)
Dept: OBGYN CLINIC | Facility: CLINIC | Age: 24
End: 2024-07-10

## 2024-07-10 VITALS
SYSTOLIC BLOOD PRESSURE: 98 MMHG | BODY MASS INDEX: 27.81 KG/M2 | HEART RATE: 86 BPM | OXYGEN SATURATION: 99 % | WEIGHT: 157 LBS | DIASTOLIC BLOOD PRESSURE: 66 MMHG
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
## 2024-07-10 DIAGNOSIS — Z34.02 PRENATAL CARE, FIRST PREGNANCY IN SECOND TRIMESTER: ICD-10-CM

## 2024-07-10 DIAGNOSIS — Z09 FOLLOW-UP EXAM: Primary | ICD-10-CM

## 2024-07-10 PROCEDURE — PNV: Performed by: PHYSICIAN ASSISTANT
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
## 2024-07-12 ENCOUNTER — APPOINTMENT (OUTPATIENT)
Dept: PHYSICAL THERAPY | Facility: REHABILITATION | Age: 24
End: 2024-07-12
Payer: COMMERCIAL

## 2024-07-12 ENCOUNTER — TELEPHONE (OUTPATIENT)
Dept: OBGYN CLINIC | Facility: CLINIC | Age: 24
End: 2024-07-12
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
## 2024-07-16 NOTE — PROGRESS NOTES
Daily Note     Today's date: 2024  Patient name: Kassie Pitts  : 2000  MRN: [id number]  Referring provider: Emily Araujo PA-C  Dx:   No diagnosis found.                 Subjective: Pt feels good, continues to do the exercises.      Objective: See treatment diary below      Assessment: Tolerated treatment well. Patient would benefit from continued PT. Started implementating more strengthening exercises so she can return to lifting weights. Able to maintaing good breathing pattern t/o exercises, did feel adding the breathing to the reverse lunges added stability to the movement.      Plan: Continue per plan of care.      POC expires Unit limit Auth Expiration date PT/OT/ST + Visit Limit?   24                              Visit/Unit Tracking  AUTH Status:  Date            Approved Used 1 2 3            Remaining                 Diagnosis: Thoracic pain + lumbar pain during pregnancy, baby boy    Precautions: Pregnant   Insurance: [facility]            Vitals 112/68  100/70  FOTO      Patient Ed           Pelvic floor anatomy  Educated         Pelvic floor exam  Completed                                                                Neuro Re-Ed           Bridges + DB + TrA + PFM  3 x 8-15 + DB + TrA + PFM  8x                                                                           Ther Ex           Aerobic conditioning  TM 3 mph  5 min Bike L5 8'        Spinal stretches LTR  Cat-cow  Thread the needle    Open book  Thread the needle 5x  Q-ped hip hinge 10x        Pball stretches Hip circles    1/2 kneel lunge          No Money GTB  3 x 10          Clams  2 x 10 ea         Sit to stands  2 x 10 2x10 10#        Shoulder rows/chest press / multifidus   YCO 2x10        UE strength   Bicep curls 7#2x10  Triceps kickbacks 6#  Chest fly 2x10 6#        LE strength   Squats 10# KB 20x  Reverse lunges w/ no weights        Ther Activity                                  Manual                                                                  Modalities

## 2024-07-18 ENCOUNTER — APPOINTMENT (OUTPATIENT)
Dept: PHYSICAL THERAPY | Facility: REHABILITATION | Age: 24
End: 2024-07-18
Payer: COMMERCIAL

## 2024-07-19 ENCOUNTER — APPOINTMENT (OUTPATIENT)
Dept: PHYSICAL THERAPY | Facility: REHABILITATION | Age: 24
End: 2024-07-19
Payer: COMMERCIAL

## 2024-08-01 ENCOUNTER — ROUTINE PRENATAL (OUTPATIENT)
Dept: OBGYN CLINIC | Facility: CLINIC | Age: 24
End: 2024-08-01
Payer: COMMERCIAL

## 2024-08-01 VITALS
BODY MASS INDEX: 27.95 KG/M2 | OXYGEN SATURATION: 100 % | HEART RATE: 85 BPM | DIASTOLIC BLOOD PRESSURE: 64 MMHG | SYSTOLIC BLOOD PRESSURE: 110 MMHG | WEIGHT: 157.8 LBS

## 2024-08-01 DIAGNOSIS — N76.0 BACTERIAL VAGINITIS: ICD-10-CM

## 2024-08-01 DIAGNOSIS — Z3A.25 25 WEEKS GESTATION OF PREGNANCY: ICD-10-CM

## 2024-08-01 DIAGNOSIS — B96.89 BACTERIAL VAGINITIS: ICD-10-CM

## 2024-08-01 DIAGNOSIS — Z34.02 PRENATAL CARE, FIRST PREGNANCY IN SECOND TRIMESTER: Primary | ICD-10-CM

## 2024-08-01 LAB
BV WHIFF TEST VAG QL: ABNORMAL
CLUE CELLS SPEC QL WET PREP: ABNORMAL
SL AMB  POCT GLUCOSE, UA: NORMAL
SL AMB POCT URINE PROTEIN: NORMAL
SL AMB POCT WET MOUNT: ABNORMAL
T VAGINALIS VAG QL WET PREP: ABNORMAL
YEAST VAG QL WET PREP: ABNORMAL

## 2024-08-01 PROCEDURE — 81002 URINALYSIS NONAUTO W/O SCOPE: CPT | Performed by: STUDENT IN AN ORGANIZED HEALTH CARE EDUCATION/TRAINING PROGRAM

## 2024-08-01 PROCEDURE — PNV: Performed by: STUDENT IN AN ORGANIZED HEALTH CARE EDUCATION/TRAINING PROGRAM

## 2024-08-01 PROCEDURE — 87210 SMEAR WET MOUNT SALINE/INK: CPT | Performed by: STUDENT IN AN ORGANIZED HEALTH CARE EDUCATION/TRAINING PROGRAM

## 2024-08-01 RX ORDER — METRONIDAZOLE 500 MG/1
500 TABLET ORAL EVERY 12 HOURS SCHEDULED
Qty: 14 TABLET | Refills: 0 | Status: SHIPPED | OUTPATIENT
Start: 2024-08-01 | End: 2024-08-08

## 2024-08-01 NOTE — ASSESSMENT & PLAN NOTE
-complaining of yellow/green discharge, increasing in amount. Denies vaginal itching or irritation.  -SSE with bright green discharge, adherent to sidewalls   -wet mount positive for BV   -flagyl sent to pharmacy. Instructions on use provided. Side effects discussed.

## 2024-08-01 NOTE — PROGRESS NOTES
25 weeks gestation of pregnancy  22yo  at 25.4wks presents for routine prenatal visit     Pt denies contractions, vaginal bleeding, leakage of fluid. Endorses fetal movement.    -continue PNVs   -28wk labs ordered today   -Afp negative   - labor precautions provided   -return in 3 weeks     Bacterial vaginitis  -complaining of yellow/green discharge, increasing in amount. Denies vaginal itching or irritation.  -SSE with bright green discharge, adherent to sidewalls   -wet mount positive for BV   -flagyl sent to pharmacy. Instructions on use provided. Side effects discussed.

## 2024-08-01 NOTE — ASSESSMENT & PLAN NOTE
22yo  at 25.4wks presents for routine prenatal visit     Pt denies contractions, vaginal bleeding, leakage of fluid. Endorses fetal movement.    -continue PNVs   -28wk labs ordered today   -Afp negative   - labor precautions provided   -return in 3 weeks

## 2024-08-16 ENCOUNTER — APPOINTMENT (OUTPATIENT)
Dept: LAB | Facility: CLINIC | Age: 24
End: 2024-08-16
Payer: COMMERCIAL

## 2024-08-16 DIAGNOSIS — Z34.02 PRENATAL CARE, FIRST PREGNANCY IN SECOND TRIMESTER: ICD-10-CM

## 2024-08-16 LAB
BASOPHILS # BLD AUTO: 0.03 THOUSANDS/ÂΜL (ref 0–0.1)
BASOPHILS NFR BLD AUTO: 0 % (ref 0–1)
EOSINOPHIL # BLD AUTO: 0.27 THOUSAND/ÂΜL (ref 0–0.61)
EOSINOPHIL NFR BLD AUTO: 3 % (ref 0–6)
ERYTHROCYTE [DISTWIDTH] IN BLOOD BY AUTOMATED COUNT: 13.2 % (ref 11.6–15.1)
GLUCOSE 1H P 50 G GLC PO SERPL-MCNC: 121 MG/DL (ref 70–134)
HCT VFR BLD AUTO: 36.6 % (ref 34.8–46.1)
HGB BLD-MCNC: 12.1 G/DL (ref 11.5–15.4)
IMM GRANULOCYTES # BLD AUTO: 0.09 THOUSAND/UL (ref 0–0.2)
IMM GRANULOCYTES NFR BLD AUTO: 1 % (ref 0–2)
LYMPHOCYTES # BLD AUTO: 1.42 THOUSANDS/ÂΜL (ref 0.6–4.47)
LYMPHOCYTES NFR BLD AUTO: 14 % (ref 14–44)
MCH RBC QN AUTO: 29.7 PG (ref 26.8–34.3)
MCHC RBC AUTO-ENTMCNC: 33.1 G/DL (ref 31.4–37.4)
MCV RBC AUTO: 90 FL (ref 82–98)
MONOCYTES # BLD AUTO: 0.44 THOUSAND/ÂΜL (ref 0.17–1.22)
MONOCYTES NFR BLD AUTO: 5 % (ref 4–12)
NEUTROPHILS # BLD AUTO: 7.61 THOUSANDS/ÂΜL (ref 1.85–7.62)
NEUTS SEG NFR BLD AUTO: 77 % (ref 43–75)
NRBC BLD AUTO-RTO: 0 /100 WBCS
PLATELET # BLD AUTO: 218 THOUSANDS/UL (ref 149–390)
PMV BLD AUTO: 10.3 FL (ref 8.9–12.7)
RBC # BLD AUTO: 4.07 MILLION/UL (ref 3.81–5.12)
TREPONEMA PALLIDUM IGG+IGM AB [PRESENCE] IN SERUM OR PLASMA BY IMMUNOASSAY: NORMAL
WBC # BLD AUTO: 9.86 THOUSAND/UL (ref 4.31–10.16)

## 2024-08-16 PROCEDURE — 86780 TREPONEMA PALLIDUM: CPT

## 2024-08-16 PROCEDURE — 85025 COMPLETE CBC W/AUTO DIFF WBC: CPT

## 2024-08-16 PROCEDURE — 36415 COLL VENOUS BLD VENIPUNCTURE: CPT

## 2024-08-16 PROCEDURE — 82950 GLUCOSE TEST: CPT

## 2024-08-19 ENCOUNTER — ROUTINE PRENATAL (OUTPATIENT)
Dept: OBGYN CLINIC | Facility: CLINIC | Age: 24
End: 2024-08-19
Payer: COMMERCIAL

## 2024-08-19 VITALS
HEART RATE: 73 BPM | DIASTOLIC BLOOD PRESSURE: 64 MMHG | OXYGEN SATURATION: 99 % | SYSTOLIC BLOOD PRESSURE: 102 MMHG | BODY MASS INDEX: 28.87 KG/M2 | WEIGHT: 163 LBS

## 2024-08-19 DIAGNOSIS — Z34.93 PRENATAL CARE IN THIRD TRIMESTER: ICD-10-CM

## 2024-08-19 DIAGNOSIS — N89.8 VAGINAL DISCHARGE: ICD-10-CM

## 2024-08-19 DIAGNOSIS — Z3A.28 28 WEEKS GESTATION OF PREGNANCY: Primary | ICD-10-CM

## 2024-08-19 DIAGNOSIS — Z23 ENCOUNTER FOR IMMUNIZATION: ICD-10-CM

## 2024-08-19 LAB
SL AMB  POCT GLUCOSE, UA: NORMAL
SL AMB POCT URINE PROTEIN: NORMAL

## 2024-08-19 PROCEDURE — 81002 URINALYSIS NONAUTO W/O SCOPE: CPT | Performed by: OBSTETRICS & GYNECOLOGY

## 2024-08-19 PROCEDURE — 87660 TRICHOMONAS VAGIN DIR PROBE: CPT | Performed by: OBSTETRICS & GYNECOLOGY

## 2024-08-19 PROCEDURE — 87510 GARDNER VAG DNA DIR PROBE: CPT | Performed by: OBSTETRICS & GYNECOLOGY

## 2024-08-19 PROCEDURE — PNV: Performed by: OBSTETRICS & GYNECOLOGY

## 2024-08-19 PROCEDURE — 87480 CANDIDA DNA DIR PROBE: CPT | Performed by: OBSTETRICS & GYNECOLOGY

## 2024-08-19 NOTE — PROGRESS NOTES
Problem List Items Addressed This Visit       28 weeks gestation of pregnancy - Primary     Pt doing well today  C/o vaginal irritation and itching- had BV a few weeks ago and then now with ongoing irritation. Affirm collected today, exam consistent with yeast. Advised to start topical monistat 7 day and will f/u results  +FM. Denies ctx, vb and lof.   28w labs reviewed- WNL  Delivery consents signed today.     The patient was counseled about the labor process.     She was counseled about the possible need for operative delivery using the vacuum or forceps and the indications for doing so. She was counseled that there is a small risk of  complications including intracranial hemorrhage, lacerations, nerve damage or fracture as well as the increased risk for more severe maternal laceration. The patient signed consent.    She was counseled regarding potential reasons that she may need a  section including arrest of dilation/descent, non-reassuring fetal status, or worsening maternal status.      She was counseled on the risks of  including bleeding, infection, and injury to surrounding structures including the bowel and bladder. She was counseled that there are medical and surgical methods to manage excessive postpartum bleeding. She was counseled that in the event of excessive blood loss, she may require blood transfusion which includes a small risk of blood borne diseases such as hepatitis and HIV. The patient is OK with receiving a blood transfusion if necessary.  The patient had an opportunity to ask questions and signed consent.   Tdap next visit  Precautions reviewed, RTO in 2 weeks          Other Visit Diagnoses       Encounter for immunization        Prenatal care in third trimester        Relevant Orders    POCT urine dip            Kassie THOMAS Pitts is a 23 y.o.  at 28w1d who presents today for routine prenatal visit.     /64   Pulse 73   Wt 73.9 kg (163 lb)   LMP  02/04/2024 (Exact Date)   SpO2 99%   BMI 28.87 kg/m²       FH 27 cm

## 2024-08-19 NOTE — ASSESSMENT & PLAN NOTE
Pt doing well today  C/o vaginal irritation and itching- had BV a few weeks ago and then now with ongoing irritation. Affirm collected today, exam consistent with yeast. Advised to start topical monistat 7 day and will f/u results  +FM. Denies ctx, vb and lof.   28w labs reviewed- WNL  Delivery consents signed today.     The patient was counseled about the labor process.     She was counseled about the possible need for operative delivery using the vacuum or forceps and the indications for doing so. She was counseled that there is a small risk of  complications including intracranial hemorrhage, lacerations, nerve damage or fracture as well as the increased risk for more severe maternal laceration. The patient signed consent.    She was counseled regarding potential reasons that she may need a  section including arrest of dilation/descent, non-reassuring fetal status, or worsening maternal status.      She was counseled on the risks of  including bleeding, infection, and injury to surrounding structures including the bowel and bladder. She was counseled that there are medical and surgical methods to manage excessive postpartum bleeding. She was counseled that in the event of excessive blood loss, she may require blood transfusion which includes a small risk of blood borne diseases such as hepatitis and HIV. The patient is OK with receiving a blood transfusion if necessary.  The patient had an opportunity to ask questions and signed consent.   Tdap next visit  Precautions reviewed, RTO in 2 weeks

## 2024-08-20 LAB
CANDIDA RRNA VAG QL PROBE: DETECTED
G VAGINALIS RRNA GENITAL QL PROBE: NOT DETECTED
T VAGINALIS RRNA GENITAL QL PROBE: NOT DETECTED

## 2024-09-05 ENCOUNTER — ROUTINE PRENATAL (OUTPATIENT)
Dept: OBGYN CLINIC | Facility: CLINIC | Age: 24
End: 2024-09-05
Payer: COMMERCIAL

## 2024-09-05 ENCOUNTER — EVALUATION (OUTPATIENT)
Dept: PHYSICAL THERAPY | Facility: REHABILITATION | Age: 24
End: 2024-09-05
Payer: COMMERCIAL

## 2024-09-05 VITALS — BODY MASS INDEX: 29.41 KG/M2 | SYSTOLIC BLOOD PRESSURE: 120 MMHG | WEIGHT: 166 LBS | DIASTOLIC BLOOD PRESSURE: 74 MMHG

## 2024-09-05 DIAGNOSIS — Z34.93 PRENATAL CARE IN THIRD TRIMESTER: Primary | ICD-10-CM

## 2024-09-05 DIAGNOSIS — O99.891 BACK PAIN IN PREGNANCY: Primary | ICD-10-CM

## 2024-09-05 DIAGNOSIS — Z3A.28 28 WEEKS GESTATION OF PREGNANCY: ICD-10-CM

## 2024-09-05 DIAGNOSIS — Z23 ENCOUNTER FOR IMMUNIZATION: ICD-10-CM

## 2024-09-05 DIAGNOSIS — M54.9 BACK PAIN IN PREGNANCY: Primary | ICD-10-CM

## 2024-09-05 LAB
DME PARACHUTE DELIVERY DATE REQUESTED: NORMAL
DME PARACHUTE ITEM DESCRIPTION: NORMAL
DME PARACHUTE ORDER STATUS: NORMAL
DME PARACHUTE SUPPLIER NAME: NORMAL
DME PARACHUTE SUPPLIER PHONE: NORMAL

## 2024-09-05 PROCEDURE — 97110 THERAPEUTIC EXERCISES: CPT | Performed by: PHYSICAL THERAPIST

## 2024-09-05 PROCEDURE — 97164 PT RE-EVAL EST PLAN CARE: CPT | Performed by: PHYSICAL THERAPIST

## 2024-09-05 PROCEDURE — 90715 TDAP VACCINE 7 YRS/> IM: CPT | Performed by: PHYSICIAN ASSISTANT

## 2024-09-05 PROCEDURE — 90471 IMMUNIZATION ADMIN: CPT | Performed by: PHYSICIAN ASSISTANT

## 2024-09-05 PROCEDURE — PNV: Performed by: PHYSICIAN ASSISTANT

## 2024-09-05 NOTE — ASSESSMENT & PLAN NOTE
Kassie  is a 23 y.o.  @30w4d who presents for routine prenatal visit.      28 wk labs - completed, wnl   TDAP - given today  Reviewed RSV vaccine. Desires at upcoming appt.   Delivery consent - on file   Plans to breastfeed. Pump - ordered     TWG 14.5 kg (32 lb)    Reports good fetal movement.  Denies LOF, vaginal bleeding, regular uterine contractions, cramping, headaches or visual changes.      Reviewed PTL/Labor precautions and FKC.

## 2024-09-05 NOTE — PROGRESS NOTES
Problem List Items Addressed This Visit       28 weeks gestation of pregnancy     Kassie  is a 23 y.o.  @30w4d who presents for routine prenatal visit.      28 wk labs - completed, wnl   TDAP - given today  Reviewed RSV vaccine. Desires at upcoming appt.   Delivery consent - on file   Plans to breastfeed. Pump - ordered     TWG 14.5 kg (32 lb)    Reports good fetal movement.  Denies LOF, vaginal bleeding, regular uterine contractions, cramping, headaches or visual changes.      Reviewed PTL/Labor precautions and FKC.              Other Visit Diagnoses       Prenatal care in third trimester    -  Primary    Relevant Orders    Ambulatory Referral to Pediatrics    Encounter for immunization        Relevant Orders    Tdap Vaccine greater than or equal to 8yo (Completed)

## 2024-09-05 NOTE — PROGRESS NOTES
Prenatal visit  GA 30w 4d  Denies any vaginal bleeding, Leaking of fluid or pelvic cramping   Normal Fetal movement   28 wk labs completed   Tdap vaccine today  Delivery consent signed on 08/19/2024  Breast pump ordered today   Peds referral placed

## 2024-09-13 LAB
DME PARACHUTE DELIVERY DATE ACTUAL: NORMAL
DME PARACHUTE DELIVERY DATE REQUESTED: NORMAL
DME PARACHUTE ITEM DESCRIPTION: NORMAL
DME PARACHUTE ORDER STATUS: NORMAL
DME PARACHUTE SUPPLIER NAME: NORMAL
DME PARACHUTE SUPPLIER PHONE: NORMAL

## 2024-09-16 ENCOUNTER — ROUTINE PRENATAL (OUTPATIENT)
Dept: OBGYN CLINIC | Facility: CLINIC | Age: 24
End: 2024-09-16
Payer: COMMERCIAL

## 2024-09-16 ENCOUNTER — APPOINTMENT (OUTPATIENT)
Dept: PHYSICAL THERAPY | Facility: REHABILITATION | Age: 24
End: 2024-09-16
Payer: COMMERCIAL

## 2024-09-16 VITALS
DIASTOLIC BLOOD PRESSURE: 82 MMHG | HEART RATE: 76 BPM | BODY MASS INDEX: 29.55 KG/M2 | SYSTOLIC BLOOD PRESSURE: 110 MMHG | OXYGEN SATURATION: 98 % | WEIGHT: 166.8 LBS

## 2024-09-16 DIAGNOSIS — Z34.03 PRENATAL CARE, FIRST PREGNANCY IN THIRD TRIMESTER: ICD-10-CM

## 2024-09-16 DIAGNOSIS — Z3A.32 32 WEEKS GESTATION OF PREGNANCY: Primary | ICD-10-CM

## 2024-09-16 LAB
SL AMB  POCT GLUCOSE, UA: NORMAL
SL AMB POCT URINE PROTEIN: NORMAL

## 2024-09-16 PROCEDURE — PNV: Performed by: OBSTETRICS & GYNECOLOGY

## 2024-09-16 PROCEDURE — 81002 URINALYSIS NONAUTO W/O SCOPE: CPT | Performed by: OBSTETRICS & GYNECOLOGY

## 2024-09-16 NOTE — PROGRESS NOTES
Problem List Items Addressed This Visit       32 weeks gestation of pregnancy - Primary     Pt doing well today, no complaints.   +FM. Denies ctx, vb and lof.   Up to date on care  Reviewed RSV vaccine today- pt considering. Information sheets provided  Birth plan-  reviewed today. Pt prefers to wait for spontaneous labor and to avoid IOL.   Precautions reviewed. RTO in 2 weeks         Relevant Orders    POCT urine dip     Other Visit Diagnoses       Prenatal care, first pregnancy in third trimester        Relevant Orders    POCT urine dip            Kassie Pitts is a 23 y.o.  at 32w1d who presents today for routine prenatal visit.     /82 (BP Location: Left arm, Patient Position: Sitting, Cuff Size: Standard)   Pulse 76   Wt 75.7 kg (166 lb 12.8 oz)   LMP 2024 (Exact Date)   SpO2 98%   BMI 29.55 kg/m²       FH 31 cm

## 2024-09-16 NOTE — PROGRESS NOTES
Prenatal visit  GA 32w 1d  Denies any vaginal bleeding, Leaking of fluid or pelvic cramping   Normal Fetal movement   28 wk labs completed   S/p Tdap vaccine 09/05/2024  Delivery consent signed on 08/19/2024  Breast pump previously ordered  Peds referral placed previously  No questions or concerns for today.

## 2024-09-16 NOTE — ASSESSMENT & PLAN NOTE
Pt doing well today, no complaints.   +FM. Denies ctx, vb and lof.   Up to date on care  Reviewed RSV vaccine today- pt considering. Information sheets provided  Birth plan-  reviewed today. Pt prefers to wait for spontaneous labor and to avoid IOL.   Precautions reviewed. RTO in 2 weeks

## 2024-09-17 ENCOUNTER — OFFICE VISIT (OUTPATIENT)
Dept: PHYSICAL THERAPY | Facility: REHABILITATION | Age: 24
End: 2024-09-17
Payer: COMMERCIAL

## 2024-09-17 DIAGNOSIS — O99.891 BACK PAIN IN PREGNANCY: Primary | ICD-10-CM

## 2024-09-17 DIAGNOSIS — M54.9 BACK PAIN IN PREGNANCY: Primary | ICD-10-CM

## 2024-09-17 PROCEDURE — 97110 THERAPEUTIC EXERCISES: CPT

## 2024-09-17 PROCEDURE — 97530 THERAPEUTIC ACTIVITIES: CPT

## 2024-09-17 NOTE — PROGRESS NOTES
Daily Note     Today's date: 2024  Patient name: Kassie Pitts  : 2000  MRN: [id number]  Referring provider: Emily Araujo PA-C  Dx:   Encounter Diagnosis     ICD-10-CM    1. Back pain in pregnancy  O99.891     M54.9           Start Time: 0800  Stop Time: 0845  Total time in clinic (min): 45 minutes    Subjective: Pt is having some lower abdominal discomfort, feels it may be the position of the baby but overall doing well.       Objective: See treatment diary below    /74      Assessment: Tolerated treatment well. Patient would benefit from continued PT. As planned reviewed and practiced labor and birth positions, rebozzo techniques and perineal stretching. Pt appeared more comfortable by the end of the session, encouraged to practice with Terence and let us know if she as any more questions and/or concerns.  Pt wishes to schedule a few more visits prior to birth and plans on attending postpartum, primary PT in agreement.      Plan: Continue per plan of care.      POC expires Unit limit Auth Expiration date PT/OT/ST + Visit Limit?   10/31  6/5/25                              Visit/Unit Tracking  AUTH Status:  Date           Approved Used 1 2 3 4           Remaining                 Diagnosis: Thoracic pain + lumbar pain during pregnancy, baby boy    Precautions: Pregnant   Insurance: [facility]          Vitals 112/68  100/70 112/70 *labor prep  100/74      Patient Ed           Pelvic floor anatomy  Educated         Pelvic floor exam  Completed                                                                Neuro Re-Ed           Bridges + DB + TrA + PFM  3 x 8-15 + DB + TrA + PFM  8x                                                                           Ther Ex           Aerobic conditioning  TM 3 mph  5 min Bike L5 8' Upright bike  Lvl 5  5 min TM 8'      Spinal stretches LTR  Cat-cow  Thread the needle    Open book  Thread the needle  5x  Q-ped hip hinge 10x Open book   15x b/l  Cat cow  15x  Thread the needle  15x b/l Pball pelvic tilts, rolls, stretch, squats 20x      Pball stretches Hip circles    1/2 kneel lunge          No Money GTB  3 x 10   Blue TB  2 x 10       Rows    RCO 2 x 15       Chest Press    Standing   Stagger 2 x 10   RCO       Clams  2 x 10 ea         Sit to stands  2 x 10 2x10 10#        Shoulder rows/chest press / multifidus   YCO 2x10        UE strength   Bicep curls 7#2x10  Triceps kickbacks 6#  Chest fly 2x10 6#        LE strength   Squats 10# KB 20x  Reverse lunges w/ no weights        Ther Activity           Labor & birth positions / rebozzo / perineal massage     30'                 Manual                                                                    Modalities

## 2024-10-02 ENCOUNTER — OFFICE VISIT (OUTPATIENT)
Dept: PHYSICAL THERAPY | Facility: REHABILITATION | Age: 24
End: 2024-10-02
Payer: COMMERCIAL

## 2024-10-02 ENCOUNTER — ROUTINE PRENATAL (OUTPATIENT)
Dept: OBGYN CLINIC | Facility: CLINIC | Age: 24
End: 2024-10-02
Payer: COMMERCIAL

## 2024-10-02 VITALS
WEIGHT: 170.2 LBS | BODY MASS INDEX: 30.15 KG/M2 | HEART RATE: 86 BPM | DIASTOLIC BLOOD PRESSURE: 70 MMHG | SYSTOLIC BLOOD PRESSURE: 114 MMHG | OXYGEN SATURATION: 99 %

## 2024-10-02 DIAGNOSIS — Z34.03 PRENATAL CARE, FIRST PREGNANCY IN THIRD TRIMESTER: Primary | ICD-10-CM

## 2024-10-02 DIAGNOSIS — Z3A.34 34 WEEKS GESTATION OF PREGNANCY: ICD-10-CM

## 2024-10-02 DIAGNOSIS — O99.891 BACK PAIN IN PREGNANCY: Primary | ICD-10-CM

## 2024-10-02 DIAGNOSIS — J45.20 MILD INTERMITTENT ASTHMA WITHOUT COMPLICATION: ICD-10-CM

## 2024-10-02 DIAGNOSIS — L30.9 ECZEMA, UNSPECIFIED TYPE: ICD-10-CM

## 2024-10-02 DIAGNOSIS — M54.9 BACK PAIN IN PREGNANCY: Primary | ICD-10-CM

## 2024-10-02 PROBLEM — N76.0 BACTERIAL VAGINITIS: Status: RESOLVED | Noted: 2024-08-01 | Resolved: 2024-10-02

## 2024-10-02 PROBLEM — B96.89 BACTERIAL VAGINITIS: Status: RESOLVED | Noted: 2024-08-01 | Resolved: 2024-10-02

## 2024-10-02 LAB
SL AMB  POCT GLUCOSE, UA: NEGATIVE
SL AMB POCT URINE PROTEIN: NEGATIVE

## 2024-10-02 PROCEDURE — PNV: Performed by: OBSTETRICS & GYNECOLOGY

## 2024-10-02 PROCEDURE — 81002 URINALYSIS NONAUTO W/O SCOPE: CPT | Performed by: OBSTETRICS & GYNECOLOGY

## 2024-10-02 PROCEDURE — 97110 THERAPEUTIC EXERCISES: CPT

## 2024-10-02 PROCEDURE — 97530 THERAPEUTIC ACTIVITIES: CPT

## 2024-10-02 NOTE — PROGRESS NOTES
Daily Note     Today's date: 10/2/2024  Patient name: Kassie Pitts  : 2000  MRN: [id number]  Referring provider: Emily Araujo PA-C  Dx:   Encounter Diagnosis     ICD-10-CM    1. 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 States she did have high BP the other day , systolic around 140 on -  states she is not sure why it was high. She and her partner Terence were able to try the pregnancy positions at home with positive results, no new questions.           Objective: See treatment diary below    /72      Assessment: Tolerated treatment well. Patient is 34w3d into her pregnancy.  Patient performed TM to increase blood flow to the area being treated, prepare the muscle for strength training and lengthening and to improve overall tolerance to activity. Vitals were taken prior to therapy and wnl (see objective) Session focused on stretches to lengthening PFM and release pelvic tension. Performed general strengthening of LE and UE  n preparation for labor.  Min cues provided for proper technique during TE's to improve intended muscular recruitment with breathing coordination with good carryover. Patient would benefit from continued PT in preparation for labor. Pt states she will call to schedule more appointments- noted her schedule is hectic.          Plan: Continue per plan of care.      POC expires Unit limit Auth Expiration date PT/OT/ST + Visit Limit?   10/31  6/5/25                              Visit/Unit Tracking  AUTH Status:  Date 6/20 6/25 7/5 9/5 10/2         Approved Used 1 2 3 4 5          Remaining                 Diagnosis: Thoracic pain + lumbar pain during pregnancy, baby boy    Precautions: Pregnant   Insurance: [facility]     6/20 6/25 7/5 9/5 9/17 10/2     Vitals 112/68  100/70 112/70 *labor prep  100/74 114/72     Patient Ed           Pelvic floor anatomy  Educated         Pelvic floor exam  Completed                                                                Neuro Re-Ed           Bridges + DB + TrA + PFM  3 x 8-15 + DB + TrA + PFM  8x                                                                           Ther Ex           Aerobic conditioning  TM 3 mph  5 min Bike L5 8' Upright bike  Lvl 5  5 min TM 8' TM 8'     Spinal stretches LTR  Cat-cow  Thread the needle    Open book  Thread the needle 5x  Q-ped hip hinge 10x Open book   15x b/l  Cat cow  15x  Thread the needle  15x b/l Pball pelvic tilts, rolls, stretch, squats 20x Pball pelvic tilts, rolls, stretch, squats 20x           Pball stretches Hip circles    1/2 kneel lunge          No Money GTB  3 x 10   Blue TB  2 x 10  Blue TB  2 x 10     Rows    RCO 2 x 15  RCO 2 x 15     Chest Press    Standing   Stagger 2 x 10   RCO  Standing   Stagger 2 x 10   RCO     Clams  2 x 10 ea         Sit to stands  2 x 10 2x10 10#        Shoulder rows/chest press / multifidus   YCO 2x10        UE strength   Bicep curls 7#2x10,    Triceps kickbacks 6#,    Chest fly 2x10 6#        LE strength   Squats 10# KB 20x,    Reverse lunges w/ no weights   Squats 10# KB 20x,    Reverse lunges w/ no weights     Ther Activity           Labor & birth positions / rebozzo / perineal massage     30'                 Manual                                                                    Modalities

## 2024-10-02 NOTE — ASSESSMENT & PLAN NOTE
Discussed history of Dupixent use prior to pregnancy - wondering if I think it's safe for breastfeeding.  Discussed very limited data for newer medications and she would prefer not to resume and breastfeed at this time.  If she cannot go without the medication, she would favor formula-feeding to avoid risk to infant.  Reassured re: decision-making.

## 2024-10-02 NOTE — ASSESSMENT & PLAN NOTE
S/p Tdap.  Will get flu vaccine at work.  RSV unavailable today - will get at next visit.  Seeing PT for back pain.   precautions.

## 2024-10-02 NOTE — PROGRESS NOTES
Prenatal visit @ 34w3d. Denies ctxs, VB, LOF.  Good FM.  Feeling tired and struggling with an eczema flare.      Problem List Items Addressed This Visit       Eczema     Discussed history of Dupixent use prior to pregnancy - wondering if I think it's safe for breastfeeding.  Discussed very limited data for newer medications and she would prefer not to resume and breastfeed at this time.  If she cannot go without the medication, she would favor formula-feeding to avoid risk to infant.  Reassured re: decision-making.          Mild intermittent asthma     Uses Advair as needed.          34 weeks gestation of pregnancy     S/p Tdap.  Will get flu vaccine at work.  RSV unavailable today - will get at next visit.  Seeing PT for back pain.   precautions.           Other Visit Diagnoses       Prenatal care, first pregnancy in third trimester    -  Primary    Relevant Orders    POCT urine dip

## 2024-10-15 PROBLEM — Z3A.36 36 WEEKS GESTATION OF PREGNANCY: Status: ACTIVE | Noted: 2024-04-03

## 2024-10-15 NOTE — ASSESSMENT & PLAN NOTE
-precautions reviewed  -s/p Tdap/flu  -RSV vaccine today  -prepregnancy BMI 23 with goal weight gain 25-35#: TWG = 38#

## 2024-10-16 ENCOUNTER — ROUTINE PRENATAL (OUTPATIENT)
Dept: OBGYN CLINIC | Facility: CLINIC | Age: 24
End: 2024-10-16
Payer: COMMERCIAL

## 2024-10-16 VITALS — BODY MASS INDEX: 30.54 KG/M2 | WEIGHT: 172.4 LBS | SYSTOLIC BLOOD PRESSURE: 118 MMHG | DIASTOLIC BLOOD PRESSURE: 76 MMHG

## 2024-10-16 DIAGNOSIS — Z29.11 NEED FOR RSV VACCINATION: ICD-10-CM

## 2024-10-16 DIAGNOSIS — Z3A.36 36 WEEKS GESTATION OF PREGNANCY: Primary | ICD-10-CM

## 2024-10-16 DIAGNOSIS — Z34.03 ENCOUNTER FOR SUPERVISION OF NORMAL FIRST PREGNANCY IN THIRD TRIMESTER: ICD-10-CM

## 2024-10-16 LAB
SL AMB  POCT GLUCOSE, UA: NEGATIVE
SL AMB POCT URINE PROTEIN: NEGATIVE

## 2024-10-16 PROCEDURE — PNV: Performed by: STUDENT IN AN ORGANIZED HEALTH CARE EDUCATION/TRAINING PROGRAM

## 2024-10-16 PROCEDURE — 90471 IMMUNIZATION ADMIN: CPT | Performed by: STUDENT IN AN ORGANIZED HEALTH CARE EDUCATION/TRAINING PROGRAM

## 2024-10-16 PROCEDURE — 81002 URINALYSIS NONAUTO W/O SCOPE: CPT | Performed by: STUDENT IN AN ORGANIZED HEALTH CARE EDUCATION/TRAINING PROGRAM

## 2024-10-16 PROCEDURE — 90678 RSV VACC PREF BIVALENT IM: CPT | Performed by: STUDENT IN AN ORGANIZED HEALTH CARE EDUCATION/TRAINING PROGRAM

## 2024-10-16 PROCEDURE — 87150 DNA/RNA AMPLIFIED PROBE: CPT | Performed by: STUDENT IN AN ORGANIZED HEALTH CARE EDUCATION/TRAINING PROGRAM

## 2024-10-16 NOTE — PROGRESS NOTES
Pt is here for routine ob visit   No concerns at this time  Urine neg/neg   No LOF,VB,Contractions  +FM   UTD tdap   RSV given today/pt tolerated well   Delivery consent signed at previous visit   GBS collected today

## 2024-10-16 NOTE — PROGRESS NOTES
24 y.o.  at 36w3d, here for routine OB visit. Feeling well overall and without concerns. Good FM. Denies LOF, VB, contractions. Denies dysuria, hematuria.      Problem List Items Addressed This Visit          Obstetrics/Gynecology    36 weeks gestation of pregnancy - Primary     -precautions reviewed  -s/p Tdap/flu  -RSV vaccine today  -prepregnancy BMI 23 with goal weight gain 25-35#: TWG = 38#           Other Visit Diagnoses       Encounter for supervision of normal first pregnancy in third trimester        Relevant Orders    POCT urine dip (Completed)    Strep B DNA probe, amplification    Need for RSV vaccination        Relevant Orders    Respiratory Syncytial Virus (RSV) vaccine (recombinant) (Abrysvo) (Completed)

## 2024-10-18 LAB — GP B STREP DNA SPEC QL NAA+PROBE: NEGATIVE

## 2024-10-23 ENCOUNTER — ROUTINE PRENATAL (OUTPATIENT)
Dept: OBGYN CLINIC | Facility: CLINIC | Age: 24
End: 2024-10-23

## 2024-10-23 VITALS — BODY MASS INDEX: 30.65 KG/M2 | WEIGHT: 173 LBS | DIASTOLIC BLOOD PRESSURE: 72 MMHG | SYSTOLIC BLOOD PRESSURE: 110 MMHG

## 2024-10-23 DIAGNOSIS — Z3A.37 37 WEEKS GESTATION OF PREGNANCY: Primary | ICD-10-CM

## 2024-10-23 PROCEDURE — PNV: Performed by: STUDENT IN AN ORGANIZED HEALTH CARE EDUCATION/TRAINING PROGRAM

## 2024-10-23 NOTE — ASSESSMENT & PLAN NOTE
-precautions reviewed  -s/p Tdap/RSV, for flu vaccine at work tomorrow  -prepregnancy BMI 23 with goal weight gain 25-35#: TWG = 39#   -would like to defer IOL until after ALLISON. Process briefly reviewed

## 2024-10-23 NOTE — PROGRESS NOTES
Pt is here for routine ob visit   No concerns at this time  Unable to void   No LOF,VB,Contractions  +FM   UTD tdap/RSV   Flu shot at work tomorrow   GBS negative   Delivery consent signed at previous visit

## 2024-10-23 NOTE — PROGRESS NOTES
24 y.o.  at 37w3d, here for routine OB visit. Feeling well overall and without concerns. Good FM. Denies LOF, VB, contractions.      Problem List Items Addressed This Visit          Obstetrics/Gynecology    37 weeks gestation of pregnancy - Primary     -precautions reviewed  -s/p Tdap/RSV, for flu vaccine at work tomorrow  -prepregnancy BMI 23 with goal weight gain 25-35#: TWG = 39#   -would like to defer IOL until after ALLISON. Process briefly reviewed

## 2024-10-30 ENCOUNTER — ROUTINE PRENATAL (OUTPATIENT)
Dept: OBGYN CLINIC | Facility: CLINIC | Age: 24
End: 2024-10-30
Payer: COMMERCIAL

## 2024-10-30 VITALS
BODY MASS INDEX: 31.07 KG/M2 | HEART RATE: 58 BPM | WEIGHT: 175.4 LBS | OXYGEN SATURATION: 97 % | SYSTOLIC BLOOD PRESSURE: 114 MMHG | DIASTOLIC BLOOD PRESSURE: 66 MMHG

## 2024-10-30 DIAGNOSIS — J45.20 MILD INTERMITTENT ASTHMA WITHOUT COMPLICATION: ICD-10-CM

## 2024-10-30 DIAGNOSIS — J45.991 COUGH VARIANT ASTHMA: ICD-10-CM

## 2024-10-30 DIAGNOSIS — Z23 NEED FOR INFLUENZA VACCINATION: ICD-10-CM

## 2024-10-30 DIAGNOSIS — Z3A.38 38 WEEKS GESTATION OF PREGNANCY: ICD-10-CM

## 2024-10-30 DIAGNOSIS — Z34.03 ENCOUNTER FOR SUPERVISION OF NORMAL FIRST PREGNANCY IN THIRD TRIMESTER: Primary | ICD-10-CM

## 2024-10-30 LAB
SL AMB  POCT GLUCOSE, UA: NEGATIVE
SL AMB POCT URINE PROTEIN: NEGATIVE

## 2024-10-30 PROCEDURE — PNV: Performed by: OBSTETRICS & GYNECOLOGY

## 2024-10-30 PROCEDURE — 90656 IIV3 VACC NO PRSV 0.5 ML IM: CPT | Performed by: OBSTETRICS & GYNECOLOGY

## 2024-10-30 PROCEDURE — 81002 URINALYSIS NONAUTO W/O SCOPE: CPT | Performed by: OBSTETRICS & GYNECOLOGY

## 2024-10-30 PROCEDURE — 90471 IMMUNIZATION ADMIN: CPT | Performed by: OBSTETRICS & GYNECOLOGY

## 2024-10-30 RX ORDER — FLUTICASONE PROPIONATE AND SALMETEROL XINAFOATE 230; 21 UG/1; UG/1
2 AEROSOL, METERED RESPIRATORY (INHALATION) 2 TIMES DAILY
Qty: 36 G | Refills: 1 | Status: SHIPPED | OUTPATIENT
Start: 2024-10-30 | End: 2025-10-30

## 2024-10-30 NOTE — PROGRESS NOTES
Prenatal visit @ 38w3d. Denies ctxs, VB, LOF.  Good FM.  Feels well overall.       Problem List Items Addressed This Visit         Mild intermittent asthma       Continues to use advair as needed.   Refills on Advair sent at patient request.           Relevant Medications     fluticasone-salmeterol (ADVAIR HFA) 230-21 MCG/ACT inhaler     Cough variant asthma     Relevant Medications     fluticasone-salmeterol (ADVAIR HFA) 230-21 MCG/ACT inhaler     38 weeks gestation of pregnancy       Flu vaccine given today.  S/p Tdap and RSV vaccines.  GBS negative.  Kick counts and labor precautions reviewed.  Will schedule IOL at next visit as prefers elective IOL just after due date.            Other Visit Diagnoses         Encounter for supervision of normal first pregnancy in third trimester    -  Primary     Relevant Orders     POCT urine dip (Completed)     Need for influenza vaccination         Relevant Orders     influenza vaccine preservative-free 0.5 mL IM (Fluzone, Afluria, Fluarix, Flulaval) (Completed)

## 2024-10-30 NOTE — ASSESSMENT & PLAN NOTE
Flu vaccine given today.  S/p Tdap and RSV vaccines.  GBS negative.  Kick counts and labor precautions reviewed.  Will schedule IOL at next visit as prefers elective IOL just after due date.

## 2024-11-05 ENCOUNTER — HOSPITAL ENCOUNTER (INPATIENT)
Facility: HOSPITAL | Age: 24
LOS: 2 days | Discharge: HOME | End: 2024-11-07
Attending: OBSTETRICS & GYNECOLOGY | Admitting: OBSTETRICS & GYNECOLOGY
Payer: COMMERCIAL

## 2024-11-05 ENCOUNTER — NURSE TRIAGE (OUTPATIENT)
Age: 24
End: 2024-11-05

## 2024-11-05 DIAGNOSIS — Z3A.39 39 WEEKS GESTATION OF PREGNANCY: Primary | ICD-10-CM

## 2024-11-05 PROBLEM — Z34.90 ENCOUNTER FOR INDUCTION OF LABOR: Status: ACTIVE | Noted: 2024-11-05

## 2024-11-05 LAB
ABO GROUP BLD: NORMAL
BLD GP AB SCN SERPL QL: NEGATIVE
ERYTHROCYTE [DISTWIDTH] IN BLOOD BY AUTOMATED COUNT: 13.5 % (ref 11.6–15.1)
HCT VFR BLD AUTO: 35.6 % (ref 34.8–46.1)
HGB BLD-MCNC: 12.1 G/DL (ref 11.5–15.4)
HOLD SPECIMEN: NORMAL
MCH RBC QN AUTO: 28.9 PG (ref 26.8–34.3)
MCHC RBC AUTO-ENTMCNC: 34 G/DL (ref 31.4–37.4)
MCV RBC AUTO: 85 FL (ref 82–98)
PLATELET # BLD AUTO: 241 THOUSANDS/UL (ref 149–390)
PMV BLD AUTO: 10.8 FL (ref 8.9–12.7)
RBC # BLD AUTO: 4.18 MILLION/UL (ref 3.81–5.12)
RH BLD: POSITIVE
SPECIMEN EXPIRATION DATE: NORMAL
WBC # BLD AUTO: 16.29 THOUSAND/UL (ref 4.31–10.16)

## 2024-11-05 PROCEDURE — 3E033VJ INTRODUCTION OF OTHER HORMONE INTO PERIPHERAL VEIN, PERCUTANEOUS APPROACH: ICD-10-PCS | Performed by: STUDENT IN AN ORGANIZED HEALTH CARE EDUCATION/TRAINING PROGRAM

## 2024-11-05 PROCEDURE — 86780 TREPONEMA PALLIDUM: CPT

## 2024-11-05 PROCEDURE — 86850 RBC ANTIBODY SCREEN: CPT

## 2024-11-05 PROCEDURE — TCMXX: Performed by: INTERNAL MEDICINE

## 2024-11-05 PROCEDURE — 99214 OFFICE O/P EST MOD 30 MIN: CPT

## 2024-11-05 PROCEDURE — 85027 COMPLETE CBC AUTOMATED: CPT

## 2024-11-05 PROCEDURE — 86900 BLOOD TYPING SEROLOGIC ABO: CPT

## 2024-11-05 PROCEDURE — 86901 BLOOD TYPING SEROLOGIC RH(D): CPT

## 2024-11-05 PROCEDURE — NC001 PR NO CHARGE: Performed by: OBSTETRICS & GYNECOLOGY

## 2024-11-05 PROCEDURE — 80053 COMPREHEN METABOLIC PANEL: CPT

## 2024-11-05 RX ORDER — SODIUM CHLORIDE, SODIUM LACTATE, POTASSIUM CHLORIDE, CALCIUM CHLORIDE 600; 310; 30; 20 MG/100ML; MG/100ML; MG/100ML; MG/100ML
125 INJECTION, SOLUTION INTRAVENOUS CONTINUOUS
Status: DISCONTINUED | OUTPATIENT
Start: 2024-11-05 | End: 2024-11-06

## 2024-11-05 RX ORDER — BUPIVACAINE HYDROCHLORIDE 2.5 MG/ML
30 INJECTION, SOLUTION EPIDURAL; INFILTRATION; INTRACAUDAL ONCE AS NEEDED
Status: DISCONTINUED | OUTPATIENT
Start: 2024-11-05 | End: 2024-11-06

## 2024-11-05 RX ORDER — FLUTICASONE PROPIONATE AND SALMETEROL XINAFOATE 230; 21 UG/1; UG/1
2 AEROSOL, METERED RESPIRATORY (INHALATION) 2 TIMES DAILY
Status: DISCONTINUED | OUTPATIENT
Start: 2024-11-05 | End: 2024-11-05

## 2024-11-05 RX ORDER — FLUTICASONE FUROATE AND VILANTEROL 100; 25 UG/1; UG/1
1 POWDER RESPIRATORY (INHALATION) DAILY
Status: DISCONTINUED | OUTPATIENT
Start: 2024-11-06 | End: 2024-11-06

## 2024-11-05 RX ORDER — ONDANSETRON 2 MG/ML
4 INJECTION INTRAMUSCULAR; INTRAVENOUS EVERY 6 HOURS PRN
Status: DISCONTINUED | OUTPATIENT
Start: 2024-11-05 | End: 2024-11-06 | Stop reason: SDUPTHER

## 2024-11-05 RX ADMIN — SODIUM CHLORIDE, SODIUM LACTATE, POTASSIUM CHLORIDE, AND CALCIUM CHLORIDE 999 ML/HR: .6; .31; .03; .02 INJECTION, SOLUTION INTRAVENOUS at 23:15

## 2024-11-05 NOTE — TELEPHONE ENCOUNTER
"39w2d OB patient calling in to report consistent contractions since 5 am today. Patient states contractions are about 4-7 minutes apart, lasting between 30 seconds to one minute. Patient rates pain about 5-8/10 depending on the contraction.  Patient reports \"bloody show\" - states she noticed this morning when she wiped at around 9 am and then again more recently states she noticed very small amount of brown blood. Denies leakage of fluid or bright red vaginal bleeding. Endorses good fetal movement.     ESC sent to on-call provider for recommendation. Dr. Muñoz recommends triage eval. Called patient and made aware of the same. ETA: 2:30 PM. ESC sent to charge RN as fyi.  Answer Assessment - Initial Assessment Questions  1. ONSET: \"When did the symptoms begin?\"         Last night - became more consistent at 5 am today  2. CONTRACTIONS: \"Describe the contractions that you are having.\" (e.g., duration, frequency, regularity, severity)      Contractions - every 4-7 minutes, lasting 30 seconds to one minute. 5-8/10 pain.  3. PREGNANCY: \"How many weeks pregnant are you?\"      39w2d  4. ALLISON: \"What date are you expecting to deliver?\"      11/10/24  5. PARITY: \"Have you had a baby before?\" If Yes, ask: \"How long did the labor last?\"        6. FETAL MOVEMENT: \"Has the baby's movement decreased or changed significantly from normal?\"      Endorses good FM  7. OTHER SYMPTOMS: \"Do you have any other symptoms?\" (e.g., leaking fluid from vagina, vaginal bleeding, fever, hand/facial swelling)      Denies    Protocols used: Pregnancy - Labor-Adult-[place]    "

## 2024-11-06 ENCOUNTER — ANESTHESIA (INPATIENT)
Dept: ANESTHESIOLOGY | Facility: HOSPITAL | Age: 24
End: 2024-11-06
Payer: COMMERCIAL

## 2024-11-06 ENCOUNTER — ANESTHESIA EVENT (INPATIENT)
Dept: ANESTHESIOLOGY | Facility: HOSPITAL | Age: 24
End: 2024-11-06
Payer: COMMERCIAL

## 2024-11-06 LAB
BASE EXCESS BLDCOA CALC-SCNC: -8.1 MMOL/L (ref 3–11)
BASE EXCESS BLDCOV CALC-SCNC: -3.3 MMOL/L (ref 1–9)
HCO3 BLDCOA-SCNC: 20.1 MMOL/L (ref 17.3–27.3)
HCO3 BLDCOV-SCNC: 21 MMOL/L (ref 12.2–28.6)
O2 CT VFR BLDCOA CALC: 8.1 ML/DL
OXYHGB MFR BLDCOA: 39.6 %
OXYHGB MFR BLDCOV: 75.9 %
PCO2 BLDCOA: 51.3 MM[HG] (ref 30–60)
PCO2 BLDCOV: 35.8 MM HG (ref 27–43)
PH BLDCOA: 7.21 [PH] (ref 7.23–7.43)
PH BLDCOV: 7.39 [PH] (ref 7.19–7.49)
PO2 BLDCOA: 20.4 MM HG (ref 5–25)
PO2 BLDCOV: 30 MM HG (ref 15–45)
SAO2 % BLDCOV: 15.7 ML/DL
TREPONEMA PALLIDUM IGG+IGM AB [PRESENCE] IN SERUM OR PLASMA BY IMMUNOASSAY: NORMAL

## 2024-11-06 PROCEDURE — 59400 OBSTETRICAL CARE: CPT | Performed by: STUDENT IN AN ORGANIZED HEALTH CARE EDUCATION/TRAINING PROGRAM

## 2024-11-06 PROCEDURE — 10907ZC DRAINAGE OF AMNIOTIC FLUID, THERAPEUTIC FROM PRODUCTS OF CONCEPTION, VIA NATURAL OR ARTIFICIAL OPENING: ICD-10-PCS | Performed by: STUDENT IN AN ORGANIZED HEALTH CARE EDUCATION/TRAINING PROGRAM

## 2024-11-06 PROCEDURE — 0HQ9XZZ REPAIR PERINEUM SKIN, EXTERNAL APPROACH: ICD-10-PCS | Performed by: STUDENT IN AN ORGANIZED HEALTH CARE EDUCATION/TRAINING PROGRAM

## 2024-11-06 PROCEDURE — 3E0R3BZ INTRODUCTION OF ANESTHETIC AGENT INTO SPINAL CANAL, PERCUTANEOUS APPROACH: ICD-10-PCS | Performed by: STUDENT IN AN ORGANIZED HEALTH CARE EDUCATION/TRAINING PROGRAM

## 2024-11-06 PROCEDURE — 82805 BLOOD GASES W/O2 SATURATION: CPT | Performed by: STUDENT IN AN ORGANIZED HEALTH CARE EDUCATION/TRAINING PROGRAM

## 2024-11-06 RX ORDER — ACETAMINOPHEN 325 MG/1
650 TABLET ORAL EVERY 4 HOURS PRN
Status: DISCONTINUED | OUTPATIENT
Start: 2024-11-06 | End: 2024-11-07 | Stop reason: HOSPADM

## 2024-11-06 RX ORDER — DIPHENHYDRAMINE HCL 25 MG
25 TABLET ORAL EVERY 6 HOURS PRN
Status: DISCONTINUED | OUTPATIENT
Start: 2024-11-06 | End: 2024-11-07 | Stop reason: HOSPADM

## 2024-11-06 RX ORDER — LIDOCAINE HYDROCHLORIDE AND EPINEPHRINE 15; 5 MG/ML; UG/ML
INJECTION, SOLUTION EPIDURAL
Status: COMPLETED | OUTPATIENT
Start: 2024-11-06 | End: 2024-11-06

## 2024-11-06 RX ORDER — BENZOCAINE/MENTHOL 6 MG-10 MG
1 LOZENGE MUCOUS MEMBRANE DAILY PRN
Status: DISCONTINUED | OUTPATIENT
Start: 2024-11-06 | End: 2024-11-07 | Stop reason: HOSPADM

## 2024-11-06 RX ORDER — OXYTOCIN/RINGER'S LACTATE 30/500 ML
1-30 PLASTIC BAG, INJECTION (ML) INTRAVENOUS
Status: DISCONTINUED | OUTPATIENT
Start: 2024-11-06 | End: 2024-11-06

## 2024-11-06 RX ORDER — ONDANSETRON 2 MG/ML
4 INJECTION INTRAMUSCULAR; INTRAVENOUS EVERY 6 HOURS PRN
Status: DISCONTINUED | OUTPATIENT
Start: 2024-11-06 | End: 2024-11-07 | Stop reason: HOSPADM

## 2024-11-06 RX ORDER — MORPHINE SULFATE 4 MG/ML
4 INJECTION, SOLUTION INTRAMUSCULAR; INTRAVENOUS ONCE
Status: COMPLETED | OUTPATIENT
Start: 2024-11-06 | End: 2024-11-06

## 2024-11-06 RX ORDER — IBUPROFEN 600 MG/1
600 TABLET, FILM COATED ORAL EVERY 6 HOURS
Status: DISCONTINUED | OUTPATIENT
Start: 2024-11-06 | End: 2024-11-07 | Stop reason: HOSPADM

## 2024-11-06 RX ORDER — ALBUTEROL SULFATE 90 UG/1
2 INHALANT RESPIRATORY (INHALATION) EVERY 4 HOURS PRN
Status: DISCONTINUED | OUTPATIENT
Start: 2024-11-06 | End: 2024-11-07 | Stop reason: HOSPADM

## 2024-11-06 RX ORDER — BUTORPHANOL TARTRATE 1 MG/ML
1 INJECTION, SOLUTION INTRAMUSCULAR; INTRAVENOUS
Status: DISCONTINUED | OUTPATIENT
Start: 2024-11-06 | End: 2024-11-06

## 2024-11-06 RX ORDER — OXYTOCIN/RINGER'S LACTATE 30/500 ML
250 PLASTIC BAG, INJECTION (ML) INTRAVENOUS
Status: ACTIVE | OUTPATIENT
Start: 2024-11-06 | End: 2024-11-06

## 2024-11-06 RX ADMIN — SODIUM CHLORIDE, SODIUM LACTATE, POTASSIUM CHLORIDE, AND CALCIUM CHLORIDE 125 ML/HR: .6; .31; .03; .02 INJECTION, SOLUTION INTRAVENOUS at 11:06

## 2024-11-06 RX ADMIN — Medication 250 MILLI-UNITS/MIN: at 13:52

## 2024-11-06 RX ADMIN — WITCH HAZEL 1 PAD: 500 SOLUTION RECTAL; TOPICAL at 15:49

## 2024-11-06 RX ADMIN — IBUPROFEN 600 MG: 600 TABLET, FILM COATED ORAL at 15:50

## 2024-11-06 RX ADMIN — ROPIVACAINE HYDROCHLORIDE: 2 INJECTION, SOLUTION EPIDURAL; INFILTRATION at 09:30

## 2024-11-06 RX ADMIN — HYDROCORTISONE 1 APPLICATION: 1 CREAM TOPICAL at 15:49

## 2024-11-06 RX ADMIN — IBUPROFEN 600 MG: 600 TABLET, FILM COATED ORAL at 22:16

## 2024-11-06 RX ADMIN — BENZOCAINE AND LEVOMENTHOL 1 APPLICATION: 200; 5 SPRAY TOPICAL at 15:49

## 2024-11-06 RX ADMIN — LIDOCAINE HYDROCHLORIDE AND EPINEPHRINE 3 ML: 15; 5 INJECTION, SOLUTION EPIDURAL at 09:21

## 2024-11-06 RX ADMIN — SODIUM CHLORIDE, SODIUM LACTATE, POTASSIUM CHLORIDE, AND CALCIUM CHLORIDE 125 ML/HR: .6; .31; .03; .02 INJECTION, SOLUTION INTRAVENOUS at 08:55

## 2024-11-06 RX ADMIN — MORPHINE SULFATE 4 MG: 4 INJECTION INTRAVENOUS at 04:25

## 2024-11-06 RX ADMIN — FLUTICASONE FUROATE AND VILANTEROL TRIFENATATE 1 PUFF: 100; 25 POWDER RESPIRATORY (INHALATION) at 10:01

## 2024-11-06 RX ADMIN — SODIUM CHLORIDE, SODIUM LACTATE, POTASSIUM CHLORIDE, AND CALCIUM CHLORIDE 125 ML/HR: .6; .31; .03; .02 INJECTION, SOLUTION INTRAVENOUS at 01:02

## 2024-11-06 RX ADMIN — ACETAMINOPHEN 650 MG: 325 TABLET ORAL at 15:49

## 2024-11-06 RX ADMIN — Medication 2 MILLI-UNITS/MIN: at 04:48

## 2024-11-06 RX ADMIN — ACETAMINOPHEN 650 MG: 325 TABLET ORAL at 20:12

## 2024-11-06 NOTE — OB LABOR/OXYTOCIN SAFETY PROGRESS
Oxytocin Safety Progress Check Note - Kassie Pitts 24 y.o. female MRN: [id number]    Unit/Bed#: -01 Encounter: [id number]    Dose (abner-units/min) Oxytocin: 6 abner-units/min  Contraction Frequency (minutes): 4-5  Contraction Intensity: Moderate  Uterine Activity Characteristics: Regular  Cervical Dilation: 10  Dilation Complete Date: 11/06/24  Dilation Complete Time: 1200  Cervical Effacement: 100  Fetal Station: 1  Baseline Rate (FHR): 115 bpm  Fetal Heart Rate (FHT): 115 BPM  FHR Category: 1  Oxytocin Safety Progress Check: Safety check completed            Vital Signs:   Vitals:    11/06/24 1035   BP: 113/70   Pulse: 67   Resp:    Temp:    SpO2:        Notes/comments:   -fully dilated, will initiate pushing   -anticipate vaginal delivery      Migdalia Savage DO 11/6/2024 12:08 PM

## 2024-11-06 NOTE — L&D DELIVERY NOTE
Vaginal Delivery Summary - OB/GYN   Kassie Pitts 24 y.o. female MRN: [id number]  Unit/Bed#: -01 Encounter: [id number]    Pre-delivery Diagnosis:   Pregnancy at 39w3d   Induction of labor for NRFHT  Mild asthma    Post-delivery Diagnosis:   Same, delivered  Delivery of term male     Procedure: Spontaneous Vaginal Delivery     Attending Physician: Dr. Savage  Resident Physician: Dr. Francisco Herrera    Anesthesia: Epidural    QBL: 150 cc  Admission H.1 g/dL  Admission platelets: 241 thousands/uL    Complications: none apparent    Specimens:   1. Arterial and venous cord gases  2. Cord blood  3. Segment of umbilical cord  4. Placenta to storage     Findings:  1. Viable male on 2024 at 1:51 PM, with APGARS of 8  and 8  at 1 and 5 minutes respectively. Weight pending at time of dictation for skin to skin bonding.  2. Spontaneous delivery of intact placenta at 1357  3. 1 degree laceration repaired with 3-0 Vicryl Rapide    Gases:  Umbilical Artery  Recent Labs     24  1358   PHCART 7.210*   BECART -8.1*       Umbilical Vein  Recent Labs     24  1358   PHCVEN 7.386   BECVEN -3.3*         Brief history and labor course:  Kassie Pitts is a 24 y.o. [id number] 39w3d . She presented to labor and delivery for rule out labor. Her pregnancy was uncomplicated. On exam in triage she was noted to be 2.5/80/-1. She was admitted for induction of labor secondary to NRFHT. She was induced with pitocin and amniotomy.     Description of delivery:    After pushing for 51 minutes, Kassie delivered a viable male , wt pending as mother is doing skin to skin bonding. The fetal vertex delivered spontaneously. There was no nuchal cord. The anterior shoulder delivered atraumatically with maternal expulsive forces and the assistance of gentle downward traction. The posterior shoulder delivered with maternal expulsive forces and the assistance of gentle upward traction. The remainder of the fetus delivered  spontaneously.     Upon delivery, the infant was placed on the mothers abdomen and the cord was doubly clamped and cut. Delayed cord clamping was achieved=.The infant was noted to cry spontaneously and was moving all extremities appropriately. There was no evidence for injury. Nurse resuscitators evaluating the . Arterial and venous cord blood gases and cord blood was collected for analysis. These were promptly sent to the lab. In the immediate post-partum, active management of the 3rd stage of labor was performed with massage, the administration of 30 units of IV pitocin, and gentle traction on the umbilical cord. The placenta delivered spontaneously and was noted to have a centrally inserted 3 vessel cord.  The placenta was sent to storage.    The vagina, cervix, perineum, and rectum were inspected and there was noted to be a 1st degree laceration.    Laceration Repair    The patient was comfortable with epidural for analgesia. The apex of the perineal laceration was identified and a suture of 3-0 Vicryl Rapide was placed 1cm above the apex. The vaginal mucosa  were closed using a running locked suture. Hemostasis was achieved.    Bimanual exam revealed minimal clots and good uterine tone.  The fundus was firm and at the level of the umbilicus.  At the conclusion of the procedure, all needle, sponge, and instrument counts were noted to be correct. Patient tolerated the procedure well and was allowed to recover in labor and delivery room with family and  before being transferred to the post-partum floor. Dr. Savage was present and participated in all key portions of the case.    Disposition:  The patient and the  both tolerated the procedure well and are recovering in labor and delivery room       Francisco Herrera MD  OB/GYN PGY-1  2024  6:54 PM

## 2024-11-06 NOTE — OB LABOR/OXYTOCIN SAFETY PROGRESS
Oxytocin Safety Progress Check Note - Kassie Pitts 24 y.o. female MRN: [id number]    Unit/Bed#: -01 Encounter: [id number]    Dose (abner-units/min) Oxytocin: 6 abner-units/min  Contraction Frequency (minutes): 4-5  Contraction Intensity: Mild/Moderate  Uterine Activity Characteristics: Regular  Cervical Dilation: 4        Cervical Effacement: 90  Fetal Station: -1  Baseline Rate (FHR): 135 bpm  Fetal Heart Rate (FHT): 135 BPM  FHR Category: 2  Oxytocin Safety Progress Check: Safety check completed            Vital Signs:   Vitals:    11/06/24 0755   BP: 144/73   Pulse: 72   Resp:    Temp:    SpO2:        Notes/comments:   -maternal position change, SVE, and AROM for fetal resuscitation    -epidural for pain intervention  -recheck prn, consider IUPC amnioinfusion     Migdalia Savage DO 11/6/2024 9:52 AM

## 2024-11-06 NOTE — OB LABOR/OXYTOCIN SAFETY PROGRESS
Labor Progress Note - Kassie Pitts 24 y.o. female MRN: [id number]    Unit/Bed#: -01 Encounter: [id number]       Contraction Frequency (minutes): 2-5  Contraction Intensity: Mild     Cervical Dilation: 4        Cervical Effacement: 90  Fetal Station: -1  Baseline Rate (FHR): 130 bpm  Fetal Heart Rate (FHT): 135 BPM  FHR Category: 1               Vital Signs:   Vitals:    11/06/24 0130   BP: 140/89   Pulse: 75   Resp: 18   Temp: 98.2 °F (36.8 °C)   SpO2:        Notes/comments:   SVE 4/90/-1. FHT category 1. Patient making progress with expectant management in labor. No indication for pitocin at this time. Discussed monitoring contraction frequency and possible need of pitocin infusion in event of protracted dilation or descent.  All questions answered. Dr. Toni lazar.    Jaocb Vyas MD 11/6/2024 2:06 AM

## 2024-11-06 NOTE — DISCHARGE SUMMARY
Discharge Summary - OB/GYN  Kassie Pitts 24 y.o. female MRN: [id number]  Unit/Bed#: -01 Encounter: [id number]    Admission Date: 2024     Discharge Date: 2024    Admitting Attending: Migdalia Savage DO    Delivering Attending: Dr. Savage    Discharging Attending: Dr. Berger    Principal Diagnosis: Pregnancy at 39w3d    Secondary Diagnosis:  Asthma    Procedures: spontaneous vaginal delivery; 1st degree perineal laceration repair    Anesthesia: epidural    Hospital course:  Kassie Pitts is a 24 y.o. [id number] 39w3d . She presented to labor and delivery for rule out labor. Her pregnancy was uncomplicated. On exam in triage she was noted to be 2.5/80/-1. She was admitted for induction of labor secondary to NRFHT. She was induced with pitocin and amniotomy.    She delivered a viable male  on 2024 at 1351. Weight 7lbs 6.2oz via normal spontaneous vaginal delivery. She sustained a 1st degree perineal laceration during delivery which was adequately repaired. Apgars were 8 (1 min) and 8 (5 min).  was transferred to  nursery. Patient tolerated the procedure well.     Her post-delivery course was complicated by gestational hypertension. Her blood pressures were mostly normotensive, with a few mildly elevates blood pressures. Her postpartum pain was well controlled with oral analgesics.    On day of discharge, she was ambulating and able to reasonably perform all ADLs. She was voiding and had appropriate bowel function. Pain was well controlled. She was discharged home on postpartum day #1 without complications. Patient was instructed to follow up with her OB as an outpatient and was given appropriate warnings to call provider if she develops signs of infection or uncontrolled pain.    Complications: none apparent    Condition at discharge: good     Discharge instructions/Information to patient and family:   See after visit summary for information provided to patient and family.       Provisions for Follow-Up Care:  See after visit summary for information related to follow-up care and any pertinent home health orders.      Disposition: See After Visit Summary for discharge disposition information.    Planned Readmission: No    Discharge medications and instructions:   Please see AVS for full list of medications upon discharge.        Francisco Herrera MD  Obstetrics & Gynecology PGY-1  11/7/2024  9:07 PM

## 2024-11-06 NOTE — PLAN OF CARE
Problem: PAIN - ADULT  Goal: Verbalizes/displays adequate comfort level or baseline comfort level  Description: Interventions:  - Encourage patient to monitor pain and request assistance  - Assess pain using appropriate pain scale  - Administer analgesics based on type and severity of pain and evaluate response  - Implement non-pharmacological measures as appropriate and evaluate response  - Consider cultural and social influences on pain and pain management  - Notify physician/advanced practitioner if interventions unsuccessful or patient reports new pain  11/6/2024 1759 by Makenzie Patel RN  Outcome: Progressing  11/6/2024 0813 by Makenzie Patel RN  Outcome: Progressing     Problem: INFECTION - ADULT  Goal: Absence or prevention of progression during hospitalization  Description: INTERVENTIONS:  - Assess and monitor for signs and symptoms of infection  - Monitor lab/diagnostic results  - Monitor all insertion sites, i.e. indwelling lines, tubes, and drains  - Monitor endotracheal if appropriate and nasal secretions for changes in amount and color  - [place] appropriate cooling/warming therapies per order  - Administer medications as ordered  - Instruct and encourage patient and family to use good hand hygiene technique  - Identify and instruct in appropriate isolation precautions for identified infection/condition  11/6/2024 1759 by Makenzie Patel RN  Outcome: Progressing  11/6/2024 0813 by Makenzie Patel RN  Outcome: Progressing  Goal: Absence of fever/infection during neutropenic period  Description: INTERVENTIONS:  - Monitor WBC    11/6/2024 1759 by Makenzie Patel RN  Outcome: Progressing  11/6/2024 0813 by Makenzie Patel RN  Outcome: Progressing     Problem: SAFETY ADULT  Goal: Patient will remain free of falls  Description: INTERVENTIONS:  - Educate patient/family on patient safety including physical limitations  - Instruct patient to call for assistance with activity    - Consult OT/PT to assist with strengthening/mobility   - Keep Call bell within reach  - Keep bed low and locked with side rails adjusted as appropriate  - Keep care items and personal belongings within reach  - Initiate and maintain comfort rounds  - Make Fall Risk Sign visible to staff  - Offer Toileting every 2-3 Hours, in advance of need  - Initiate/Maintain alarm  - Obtain necessary fall risk management equipment:   - Apply yellow socks and bracelet for high fall risk patients  - Consider moving patient to room near nurses station  11/6/2024 1759 by Makenzie Patel RN  Outcome: Progressing  11/6/2024 0813 by Makenzie Patel RN  Outcome: Progressing  Goal: Maintain or return to baseline ADL function  Description: INTERVENTIONS:  -  Assess patient's ability to carry out ADLs; assess patient's baseline for ADL function and identify physical deficits which impact ability to perform ADLs (bathing, care of mouth/teeth, toileting, grooming, dressing, etc.)  - Assess/evaluate cause of self-care deficits   - Assess range of motion  - Assess patient's mobility; develop plan if impaired  - Assess patient's need for assistive devices and provide as appropriate  - Encourage maximum independence but intervene and supervise when necessary  - Involve family in performance of ADLs  - Assess for home care needs following discharge   - Consider OT consult to assist with ADL evaluation and planning for discharge  - Provide patient education as appropriate  11/6/2024 1759 by Makenzie Patel RN  Outcome: Progressing  11/6/2024 0813 by Makenzie Patel RN  Outcome: Progressing  Goal: Maintains/Returns to pre admission functional level  Description: INTERVENTIONS:  - Perform AM-PAC 6 Click Basic Mobility/ Daily Activity assessment daily.  - Set and communicate daily mobility goal to care team and patient/family/caregiver.   - Collaborate with rehabilitation services on mobility goals if consulted  - Out of bed for  toileting  - Record patient progress and toleration of activity level   11/6/2024 1759 by Makenzie Patel RN  Outcome: Progressing  11/6/2024 0813 by Makenzie Patel RN  Outcome: Progressing     Problem: DISCHARGE PLANNING  Goal: Discharge to home or other facility with appropriate resources  Description: INTERVENTIONS:  - Identify barriers to discharge w/patient and caregiver  - Arrange for needed discharge resources and transportation as appropriate  - Identify discharge learning needs (meds, wound care, etc.)  - Arrange for interpretive services to assist at discharge as needed  - Refer to Case Management Department for coordinating discharge planning if the patient needs post-hospital services based on physician/advanced practitioner order or complex needs related to functional status, cognitive ability, or social support system  11/6/2024 1759 by Makenzie Patel RN  Outcome: Progressing  11/6/2024 0813 by Makenzie Patel RN  Outcome: Progressing     Problem: Knowledge Deficit  Goal: Patient/family/caregiver demonstrates understanding of disease process, treatment plan, medications, and discharge instructions  Description: Complete learning assessment and assess knowledge base.  Interventions:  - Provide teaching at level of understanding  - Provide teaching via preferred learning methods  11/6/2024 1759 by Makenzie Patel RN  Outcome: Progressing  11/6/2024 0813 by Makenzie Patel RN  Outcome: Progressing  Goal: Verbalizes understanding of labor plan  Description: Assess patient/family/caregiver's baseline knowledge level and ability to understand information.  Provide education via patient/family/caregiver's preferred learning method at appropriate level of understanding.     1. Provide teaching at level of understanding.  2. Provide teaching via preferred learning method(s).  11/6/2024 1759 by Makenzie Patel RN  Outcome: Progressing  11/6/2024 0813 by Makenzie Patel  RN  Outcome: Progressing     Problem: POSTPARTUM  Goal: Experiences normal postpartum course  Description: INTERVENTIONS:  - Monitor maternal vital signs  - Assess uterine involution and lochia  Outcome: Progressing  Goal: Appropriate maternal -  bonding  Description: INTERVENTIONS:  - Identify family support  - Assess for appropriate maternal/infant bonding   -Encourage maternal/infant bonding opportunities  - Referral to  or  as needed  Outcome: Progressing  Goal: Establishment of infant feeding pattern  Description: INTERVENTIONS:  - Assess breast/bottle feeding  - Refer to lactation as needed  Outcome: Progressing  Goal: Incision(s), wounds(s) or drain site(s) healing without S/S of infection  Description: INTERVENTIONS  - Assess and document perineumand surrounding tissue  - Provide patient and family education  - Perform skin care/dressing changes   Outcome: Progressing

## 2024-11-06 NOTE — PLAN OF CARE
Problem: PAIN - ADULT  Goal: Verbalizes/displays adequate comfort level or baseline comfort level  Description: Interventions:  - Encourage patient to monitor pain and request assistance  - Assess pain using appropriate pain scale  - Administer analgesics based on type and severity of pain and evaluate response  - Implement non-pharmacological measures as appropriate and evaluate response  - Consider cultural and social influences on pain and pain management  - Notify physician/advanced practitioner if interventions unsuccessful or patient reports new pain  Outcome: Progressing     Problem: INFECTION - ADULT  Goal: Absence or prevention of progression during hospitalization  Description: INTERVENTIONS:  - Assess and monitor for signs and symptoms of infection  - Monitor lab/diagnostic results  - Monitor all insertion sites, i.e. indwelling lines, tubes, and drains  - Monitor endotracheal if appropriate and nasal secretions for changes in amount and color  - [place] appropriate cooling/warming therapies per order  - Administer medications as ordered  - Instruct and encourage patient and family to use good hand hygiene technique  - Identify and instruct in appropriate isolation precautions for identified infection/condition  Outcome: Progressing  Goal: Absence of fever/infection during neutropenic period  Description: INTERVENTIONS:  - Monitor WBC    Outcome: Progressing     Problem: SAFETY ADULT  Goal: Patient will remain free of falls  Description: INTERVENTIONS:  - Educate patient/family on patient safety including physical limitations  - Instruct patient to call for assistance with activity   - Consult OT/PT to assist with strengthening/mobility   - Keep Call bell within reach  - Keep bed low and locked with side rails adjusted as appropriate  - Keep care items and personal belongings within reach  - Initiate and maintain comfort rounds  - Make Fall Risk Sign visible to staff  - Offer Toileting every 2-3  Hours, in advance of need  - Initiate/Maintain alarm  - Obtain necessary fall risk management equipment:   - Apply yellow socks and bracelet for high fall risk patients  - Consider moving patient to room near nurses station  Outcome: Progressing  Goal: Maintain or return to baseline ADL function  Description: INTERVENTIONS:  -  Assess patient's ability to carry out ADLs; assess patient's baseline for ADL function and identify physical deficits which impact ability to perform ADLs (bathing, care of mouth/teeth, toileting, grooming, dressing, etc.)  - Assess/evaluate cause of self-care deficits   - Assess range of motion  - Assess patient's mobility; develop plan if impaired  - Assess patient's need for assistive devices and provide as appropriate  - Encourage maximum independence but intervene and supervise when necessary  - Involve family in performance of ADLs  - Assess for home care needs following discharge   - Consider OT consult to assist with ADL evaluation and planning for discharge  - Provide patient education as appropriate  Outcome: Progressing  Goal: Maintains/Returns to pre admission functional level  Description: INTERVENTIONS:  - Perform AM-PAC 6 Click Basic Mobility/ Daily Activity assessment daily.  - Set and communicate daily mobility goal to care team and patient/family/caregiver.   - Collaborate with rehabilitation services on mobility goals if consulted  - Out of bed for toileting  - Record patient progress and toleration of activity level   Outcome: Progressing     Problem: DISCHARGE PLANNING  Goal: Discharge to home or other facility with appropriate resources  Description: INTERVENTIONS:  - Identify barriers to discharge w/patient and caregiver  - Arrange for needed discharge resources and transportation as appropriate  - Identify discharge learning needs (meds, wound care, etc.)  - Arrange for interpretive services to assist at discharge as needed  - Refer to Case Management Department for  coordinating discharge planning if the patient needs post-hospital services based on physician/advanced practitioner order or complex needs related to functional status, cognitive ability, or social support system  Outcome: Progressing     Problem: Knowledge Deficit  Goal: Patient/family/caregiver demonstrates understanding of disease process, treatment plan, medications, and discharge instructions  Description: Complete learning assessment and assess knowledge base.  Interventions:  - Provide teaching at level of understanding  - Provide teaching via preferred learning methods  Outcome: Progressing  Goal: Verbalizes understanding of labor plan  Description: Assess patient/family/caregiver's baseline knowledge level and ability to understand information.  Provide education via patient/family/caregiver's preferred learning method at appropriate level of understanding.     1. Provide teaching at level of understanding.  2. Provide teaching via preferred learning method(s).  Outcome: Progressing     Problem: Labor & Delivery  Goal: Manages discomfort  Description: Assess and monitor for signs and symptoms of discomfort.  Assess patient's pain level regularly and per hospital policy.  Administer medications as ordered. Support use of nonpharmacological methods to help control pain such as distraction, imagery, relaxation, and application of heat and cold.  Collaborate with interdisciplinary team and patient to determine appropriate pain management plan.    1. Include patient in decisions related to comfort.  2. Offer non-pharmacological pain management interventions.  3. Report ineffective pain management to physician.  Outcome: Progressing  Goal: Patient vital signs are stable  Description: 1. Assess vital signs - vaginal delivery.  Outcome: Progressing     Problem: BIRTH - VAGINAL/ SECTION  Goal: Fetal and maternal status remain reassuring during the birth process  Description: INTERVENTIONS:  - Monitor vital  signs  - Monitor fetal heart rate  - Monitor uterine activity  - Monitor labor progression (vaginal delivery)  - DVT prophylaxis  - Antibiotic prophylaxis  Outcome: Progressing  Goal: Emotionally satisfying birthing experience for mother/fetus  Description: Interventions:  - Assess, plan, implement and evaluate the nursing care given to the patient in labor  - Advocate the philosophy that each childbirth experience is a unique experience and support the family's chosen level of involvement and control during the labor process   - Actively participate in both the patient's and family's teaching of the birth process  - Consider cultural, Anglican and age-specific factors and plan care for the patient in labor  Outcome: Progressing

## 2024-11-06 NOTE — L&D DELIVERY NOTE
Vaginal Delivery Summary - OB/GYN   Kassie Pitts 24 y.o. female MRN: [id number]  Unit/Bed#: -01 Encounter: [id number]    Pre-delivery Diagnosis:   Pregnancy at 39.3wks   NRFHT  Asthma    Post-delivery Diagnosis: same, delivered    Procedure: Spontaneous Vaginal Delivery     [facility] Practice: [facility]    Attending Physician: Dr. Savage  Resident Physician: Dr. Herrera    Anesthesia: Epidural,     QBL: Non-Surgical QBL (mL): 150        Complications: none apparent    Specimens:   1. Arterial and venous cord gases  2. Cord blood  3. Segment of umbilical cord  4. Placenta to storage     Findings:  1. Viable male  on 2024 1:51 PM via Vaginal, Spontaneous . with APGAR scores of 8  and 8  at 1 and 5 minutes, respectively. Weight pending at time of dictation for skin to skin bonding.  2. Spontaneous delivery of intact placenta  3. 1 degree laceration repaired with 3-0 Vicryl Rapide      Gases:  Umbilical Artery  Recent Labs     24  1358   PHCART 7.210*   BECART -8.1*       Umbilical Vein  Recent Labs     24  1358   PHCVEN 7.386   BECVEN -3.3*           Brief history and labor course:  Kassie Pitts is a 24 y.o. [id number] 39w3d . She presented to labor and delivery for rule out labor. Her pregnancy was uncomplicated. On exam in triage she was noted to be 2.5/80/-1. She was admitted for induction of labor secondary to NRFHT. She was induced with pitocin and amniotomy.    Description of delivery:    After pushing, Kassie delivered a viable male , wt pending as mother is doing skin to skin bonding. The fetal vertex delivered OAposition spontaneously. There was a nuchal cord wrapped around fetal neck once. The anterior shoulder delivered atraumatically with maternal expulsive forces and the assistance of gentle downward traction. The posterior shoulder delivered with maternal expulsive forces and the assistance of gentle upward traction. The remainder of the fetus delivered  spontaneously.      Upon delivery, the infant was placed on Joelyn's abdomen and the cord was doubly clamped and cut. Delayed cord clamping was achieved. The infant was noted to cry spontaneously and was moving all extremities appropriately. There was no evidence for injury. Awaiting nurse resuscitators evaluated the . Arterial and venous cord blood gases and cord blood was collected for analysis. These were promptly sent to the lab. In the immediate post-partum, active management of the 3rd stage of labor was performed with massage, the administration of 30 units of IV pitocin, and gentle traction on the umbilical cord. The placenta delivered spontaneously and was noted to have a centrally inserted 3 vessel cord.  The placenta was sent to storage.     Laceration Repair  The vagina, cervix, perineum, and rectum were inspected and there was noted to be a first degree laceration .    The patient was comfortable with epidural for analgesia. The vaginal mucosa was re approximated using 3-0 vicryl rapide.     At the conclusion of the procedure, all needle, sponge, and instrument counts were noted to be correct. Dr. Savage was present and participated in all key portions of the case.    Disposition:  The patient and the  both tolerated the procedure well and are recovering in labor and delivery room       Migdalia Savage DO  2024  2:20 PM

## 2024-11-06 NOTE — ANESTHESIA PREPROCEDURE EVALUATION
Procedure:  LABOR ANALGESIA    Relevant Problems   GYN   (+) 39 weeks gestation of pregnancy      MUSCULOSKELETAL   (+) Chondromalacia patellae      PULMONARY   (+) Cough variant asthma   (+) Mild intermittent asthma        Physical Exam    Airway    Mallampati score: II  TM Distance: >3 FB  Neck ROM: full     Dental   No notable dental hx     Cardiovascular  Rhythm: regular, Rate: normal, Cardiovascular exam normal    Pulmonary  Pulmonary exam normal Breath sounds clear to auscultation    Other Findings  post-pubertal.      Anesthesia Plan  ASA Score- 2     Anesthesia Type- epidural with ASA Monitors.         Additional Monitors:     Airway Plan:     Comment: Discussed the risks/benefits of neuraxial anesthesia, including risk of bleeding/infection/damage to underlying structures, the likely side effect of nausea, and unlikely complication of spinal headache..       Plan Factors-Exercise tolerance (METS): >4 METS.    Chart reviewed.   Existing labs reviewed. Patient summary reviewed.    Patient is not a current smoker.  Patient instructed to abstain from smoking on day of procedure. Patient did not smoke on day of surgery.            Induction-     Postoperative Plan-     Perioperative Resuscitation Plan - Level 1 - Full Code.       Informed Consent- Anesthetic plan and risks discussed with patient.  I personally reviewed this patient with the CRNA. Discussed and agreed on the Anesthesia Plan with the CRNA..

## 2024-11-06 NOTE — ASSESSMENT & PLAN NOTE
IOL for NRFHT  Admit to L&D  CBC, RPR, Type & Screen  Clear liquid diet  GBS status: negative   Analgesia at maternal request  Expectant management, if unchanged consider augmentation

## 2024-11-06 NOTE — ANESTHESIA POSTPROCEDURE EVALUATION
Post-Op Assessment Note    CV Status:  Stable    Pain management: adequate      Post-op block assessment: no complications, site cleaned and catheter intact   Mental Status:  Alert and awake   Hydration Status:  Euvolemic   PONV Controlled:  Controlled   Airway Patency:  Patent     Post Op Vitals Reviewed: Yes    No anethesia notable event occurred.    Staff: CRNA           Last Filed PACU Vitals:  Vitals Value Taken Time   Temp     Pulse 93 11/06/24 1501   /66 11/06/24 1501   Resp     SpO2         Modified Dione:  No data recorded

## 2024-11-06 NOTE — ANESTHESIA PROCEDURE NOTES
Epidural Block    Start time: 11/6/2024 9:19 AM  Reason for block: procedure for pain  Staffing  Performed by: Terence Tao MD  Authorized by: Terence Tao MD    Preanesthetic Checklist  Completed: patient identified, IV checked, site marked, risks and benefits discussed, surgical consent, monitors and equipment checked, pre-op evaluation and timeout performed  Epidural  Patient position: sitting  Prep: ChloraPrep  Sedation Level: no sedation  Patient monitoring: frequent blood pressure checks, continuous pulse oximetry and heart rate  Approach: midline  Location: lumbar, L3-4  Injection technique: TR saline  Needle  Needle type: Tuohy   Needle gauge: 18 G  Needle insertion depth: 5 cm  Catheter type: multi-orifice  Catheter size: 20 G  Catheter at skin depth: 11 cm  Catheter securement method: stabilization device, clear occlusive dressing and tape  Test dose: negativelidocaine-epinephrine (XYLOCAINE-MPF/EPINEPHRINE) 1.5 %-1:200,000 injection 3 mL - Epidural   3 mL - 11/6/2024 9:21:00 AM  Assessment  Sensory level: T10  Number of attempts: 1negative aspiration for CSF, negative aspiration for heme and no paresthesia on injection  patient tolerated the procedure well with no immediate complications  Additional Notes  Unremarkable

## 2024-11-06 NOTE — H&P
H&P Exam - Obstetrics   Kassie Pitts 24 y.o. female MRN: [id number]  Unit/Bed#: LD TRIAGE  Encounter: [id number]      ASSESSMENT:  23yo  at 39w2d weeks gestation who is being admitted for IOL for NRFHT.  EFW: 7 lbs by Leopold's  VTX by transabdominal ultrasound     PLAN:  Mild intermittent asthma  Assessment & Plan  Breo-Ellipta 2 puffs BID ordered  Avoid hemabate    * Encounter for induction of labor  Assessment & Plan  IOL for NRFHT  Admit to L&D  CBC, RPR, Type & Screen  Clear liquid diet  GBS status: negative   Analgesia at maternal request  Expectant management, if unchanged consider augmentation            Discussed with Dr. Muñoz      This patient will be an INPATIENT  and I certify the anticipated length of stay is >2 Midnights.      History of Present Illness     Chief Complaint: Induction of labor    HPI:  Kassie Pitts is a 24 y.o.  female with an ALILSON of 11/10/2024, by Last Menstrual Period at 39w2d weeks gestation who is being admitted for induction of labor for NRFHT. She initially presented to L&D triage for evaluation of uterine contractions q5-10 minutes, however, NST showed variable decelerations.     Contractions: present q5-10 minutes  Loss of fluid: denies  Vaginal bleeding: denies  Fetal movement: present        PREGNANCY COMPLICATIONS:   1) Asthma    OB History    Para Term  AB Living   1 0 0 0 0 0   SAB IAB Ectopic Multiple Live Births   0 0 0 0 0      # Outcome Date GA Lbr Ramy/2nd Weight Sex Type Anes PTL Lv   1 Current                Baby complications/comments: none known    Review of Systems   Constitutional:  Negative for chills and fever.   Respiratory: Negative.     Cardiovascular: Negative.    Gastrointestinal:  Positive for abdominal pain.   Genitourinary:  Negative for vaginal bleeding.   Musculoskeletal:  Positive for back pain.   Skin: Negative.    Neurological:  Negative for headaches.   Psychiatric/Behavioral: Negative.           Historical  Information   Past Medical History:   Diagnosis Date    Allergies     Asthma     inhaler prn    Eczema     Varicella     had vaccines     Past Surgical History:   Procedure Laterality Date    KNEE ARTHROSCOPY Left 2015    patella    WISDOM TOOTH EXTRACTION      X2      Social History   Social History     Substance and Sexual Activity   Alcohol Use Not Currently    Comment: none with pregnancy     Social History     Substance and Sexual Activity   Drug Use Never    Comment: denies self, denies FOB, PGM- ? drug hx, MGF- hx of etoh abuse     Social History     Tobacco Use   Smoking Status Never   Smokeless Tobacco Never     Family History: Family history non-contributory    Meds/Allergies      Medications Prior to Admission:     cetirizine (ZyrTEC) 10 mg tablet    fluticasone-salmeterol (ADVAIR HFA) 230-21 MCG/ACT inhaler    Prenatal Vit-Fe Fumarate-FA (Prenatal Vitamin) 27-0.8 MG TABS    Dupixent 300 MG/2ML SOPN    EPINEPHrine (EPIPEN) 0.3 mg/0.3 mL SOAJ     Allergies   Allergen Reactions    Nuts - Food Allergy Anaphylaxis, Itching and Other (See Comments)    Erythromycin Hives    Erythromycin Base Hives and Rash    Red Dye - Food Allergy Rash       OBJECTIVE:    Vitals: Last menstrual period 02/04/2024.There is no height or weight on file to calculate BMI.    Physical Exam  Vitals reviewed. Exam conducted with a chaperone present.   Constitutional:       General: She is not in acute distress.     Appearance: Normal appearance.   HENT:      Head: Normocephalic and atraumatic.   Eyes:      Extraocular Movements: Extraocular movements intact.   Cardiovascular:      Rate and Rhythm: Normal rate.   Pulmonary:      Effort: Pulmonary effort is normal. No respiratory distress.   Abdominal:      Comments: Gravid     Genitourinary:     General: Normal vulva.   Skin:     General: Skin is warm and dry.   Neurological:      Mental Status: She is alert and oriented to person, place, and time.   Psychiatric:         Mood and  Affect: Mood normal.         Behavior: Behavior normal.             Cervix:   2.5/80/-1    Fetal heart rate:    Baseline 135 bpm  Moderate variability  15x15 accelerations  Variable decelerations    [place]:    Contractions q5-10 minutes    GBS: negative    Prenatal Labs:   Blood Type:   Lab Results   Component Value Date/Time    ABO Grouping A 11/05/2024 08:24 PM      D (Rh type):   Lab Results   Component Value Date/Time    Rh Factor Positive 11/05/2024 08:24 PM      Antibody Screen: negative    HCT/HGB:   Lab Results   Component Value Date/Time    Hematocrit 35.6 11/05/2024 08:24 PM    Hemoglobin 12.1 11/05/2024 08:24 PM       Platelets:   Lab Results   Component Value Date/Time    Platelets 241 11/05/2024 08:24 PM       1 hour Glucola:   Lab Results   Component Value Date/Time    Glucose 121 08/16/2024 11:28 AM        Rubella:   Lab Results   Component Value Date/Time    Rubella IgG Quant 56.5 04/29/2024 10:19 AM        RPR: nonreactive    Hep B:   Lab Results   Component Value Date/Time    Hepatitis B Surface Ag Non-reactive 04/29/2024 10:19 AM       Hep C ab: nonreactive    HIV: nonreactive    Chlamydia: negative    Gonorrhea:   Lab Results   Component Value Date/Time    N gonorrhoeae, DNA Probe Negative 05/01/2024 08:35 AM         Invasive Devices       Peripheral Intravenous Line  Duration             Peripheral IV 11/05/24 Distal;Dorsal (posterior);Right Forearm <1 day                    Naida Langford MD  OBGYN, PGY-I  11/05/24

## 2024-11-06 NOTE — PLAN OF CARE
Problem: PAIN - ADULT  Goal: Verbalizes/displays adequate comfort level or baseline comfort level  Description: Interventions:  - Encourage patient to monitor pain and request assistance  - Assess pain using appropriate pain scale  - Administer analgesics based on type and severity of pain and evaluate response  - Implement non-pharmacological measures as appropriate and evaluate response  - Consider cultural and social influences on pain and pain management  - Notify physician/advanced practitioner if interventions unsuccessful or patient reports new pain  Outcome: Progressing     Problem: INFECTION - ADULT  Goal: Absence or prevention of progression during hospitalization  Description: INTERVENTIONS:  - Assess and monitor for signs and symptoms of infection  - Monitor lab/diagnostic results  - Monitor all insertion sites, i.e. indwelling lines, tubes, and drains  - Monitor endotracheal if appropriate and nasal secretions for changes in amount and color  - [place] appropriate cooling/warming therapies per order  - Administer medications as ordered  - Instruct and encourage patient and family to use good hand hygiene technique  - Identify and instruct in appropriate isolation precautions for identified infection/condition  Outcome: Progressing  Goal: Absence of fever/infection during neutropenic period  Description: INTERVENTIONS:  - Monitor WBC    Outcome: Progressing     Problem: SAFETY ADULT  Goal: Patient will remain free of falls  Description: INTERVENTIONS:  - Educate patient/family on patient safety including physical limitations  - Instruct patient to call for assistance with activity   - Consult OT/PT to assist with strengthening/mobility   - Keep Call bell within reach  - Keep bed low and locked with side rails adjusted as appropriate  - Keep care items and personal belongings within reach  - Initiate and maintain comfort rounds  - Make Fall Risk Sign visible to staff  - Offer Toileting every PRN  Hours, in advance of need  Outcome: Progressing  Goal: Maintain or return to baseline ADL function  Description: INTERVENTIONS:  -  Assess patient's ability to carry out ADLs; assess patient's baseline for ADL function and identify physical deficits which impact ability to perform ADLs (bathing, care of mouth/teeth, toileting, grooming, dressing, etc.)  - Assess/evaluate cause of self-care deficits   - Assess range of motion  - Assess patient's mobility; develop plan if impaired  - Assess patient's need for assistive devices and provide as appropriate  - Encourage maximum independence but intervene and supervise when necessary  - Involve family in performance of ADLs  - Assess for home care needs following discharge   - Consider OT consult to assist with ADL evaluation and planning for discharge  - Provide patient education as appropriate  Outcome: Progressing  Goal: Maintains/Returns to pre admission functional level  Description: INTERVENTIONS:  - Perform AM-PAC 6 Click Basic Mobility/ Daily Activity assessment daily.  - Set and communicate daily mobility goal to care team and patient/family/caregiver.   - Collaborate with rehabilitation services on mobility goals if consulted  - Perform Range of Motion PRN times a day.  - Reposition patient every PRN hours.  - Dangle patient PRN times a day  - Stand patient PRN times a day  - Ambulate patient PRN times a day  - Out of bed to chair PRN times a day   - Out of bed for meals PRN times a day  - Out of bed for toileting  - Record patient progress and toleration of activity level   Outcome: Progressing     Problem: DISCHARGE PLANNING  Goal: Discharge to home or other facility with appropriate resources  Description: INTERVENTIONS:  - Identify barriers to discharge w/patient and caregiver  - Arrange for needed discharge resources and transportation as appropriate  - Identify discharge learning needs (meds, wound care, etc.)  - Arrange for interpretive services to assist  at discharge as needed  - Refer to Case Management Department for coordinating discharge planning if the patient needs post-hospital services based on physician/advanced practitioner order or complex needs related to functional status, cognitive ability, or social support system  Outcome: Progressing     Problem: Knowledge Deficit  Goal: Patient/family/caregiver demonstrates understanding of disease process, treatment plan, medications, and discharge instructions  Description: Complete learning assessment and assess knowledge base.  Interventions:  - Provide teaching at level of understanding  - Provide teaching via preferred learning methods  Outcome: Progressing  Goal: Verbalizes understanding of labor plan  Description: Assess patient/family/caregiver's baseline knowledge level and ability to understand information.  Provide education via patient/family/caregiver's preferred learning method at appropriate level of understanding.     1. Provide teaching at level of understanding.  2. Provide teaching via preferred learning method(s).  Outcome: Progressing     Problem: Labor & Delivery  Goal: Manages discomfort  Description: Assess and monitor for signs and symptoms of discomfort.  Assess patient's pain level regularly and per hospital policy.  Administer medications as ordered. Support use of nonpharmacological methods to help control pain such as distraction, imagery, relaxation, and application of heat and cold.  Collaborate with interdisciplinary team and patient to determine appropriate pain management plan.    1. Include patient in decisions related to comfort.  2. Offer non-pharmacological pain management interventions.  3. Report ineffective pain management to physician.  Outcome: Progressing  Goal: Patient vital signs are stable  Description: 1. Assess vital signs - vaginal delivery.  Outcome: Progressing     Problem: BIRTH - VAGINAL/ SECTION  Goal: Fetal and maternal status remain reassuring  during the birth process  Description: INTERVENTIONS:  - Monitor vital signs  - Monitor fetal heart rate  - Monitor uterine activity  - Monitor labor progression (vaginal delivery)  - DVT prophylaxis  - Antibiotic prophylaxis  Outcome: Progressing  Goal: Emotionally satisfying birthing experience for mother/fetus  Description: Interventions:  - Assess, plan, implement and evaluate the nursing care given to the patient in labor  - Advocate the philosophy that each childbirth experience is a unique experience and support the family's chosen level of involvement and control during the labor process   - Actively participate in both the patient's and family's teaching of the birth process  - Consider cultural, [place] and age-specific factors and plan care for the patient in labor  Outcome: Progressing

## 2024-11-07 VITALS
RESPIRATION RATE: 18 BRPM | BODY MASS INDEX: 31.08 KG/M2 | WEIGHT: 175.4 LBS | OXYGEN SATURATION: 98 % | DIASTOLIC BLOOD PRESSURE: 63 MMHG | HEIGHT: 63 IN | TEMPERATURE: 98.6 F | HEART RATE: 70 BPM | SYSTOLIC BLOOD PRESSURE: 109 MMHG

## 2024-11-07 PROBLEM — O13.9 GESTATIONAL HYPERTENSION: Status: ACTIVE | Noted: 2024-11-07

## 2024-11-07 LAB
ALBUMIN SERPL BCG-MCNC: 3.9 G/DL (ref 3.5–5)
ALP SERPL-CCNC: 110 U/L (ref 34–104)
ALT SERPL W P-5'-P-CCNC: 22 U/L (ref 7–52)
ANION GAP SERPL CALCULATED.3IONS-SCNC: 7 MMOL/L (ref 4–13)
AST SERPL W P-5'-P-CCNC: 20 U/L (ref 13–39)
BILIRUB SERPL-MCNC: 0.28 MG/DL (ref 0.2–1)
BUN SERPL-MCNC: 10 MG/DL (ref 5–25)
CALCIUM SERPL-MCNC: 9.3 MG/DL (ref 8.4–10.2)
CHLORIDE SERPL-SCNC: 106 MMOL/L (ref 96–108)
CO2 SERPL-SCNC: 24 MMOL/L (ref 21–32)
CREAT SERPL-MCNC: 0.76 MG/DL (ref 0.6–1.3)
GFR SERPL CREATININE-BSD FRML MDRD: 109 ML/MIN/1.73SQ M
GLUCOSE SERPL-MCNC: 96 MG/DL (ref 65–140)
POTASSIUM SERPL-SCNC: 3.9 MMOL/L (ref 3.5–5.3)
PROT SERPL-MCNC: 7 G/DL (ref 6.4–8.4)
SODIUM SERPL-SCNC: 137 MMOL/L (ref 135–147)

## 2024-11-07 PROCEDURE — 99024 POSTOP FOLLOW-UP VISIT: CPT | Performed by: OBSTETRICS & GYNECOLOGY

## 2024-11-07 PROCEDURE — NC001 PR NO CHARGE: Performed by: OBSTETRICS & GYNECOLOGY

## 2024-11-07 RX ORDER — IBUPROFEN 600 MG/1
600 TABLET, FILM COATED ORAL EVERY 6 HOURS PRN
Start: 2024-11-07 | End: 2024-11-12 | Stop reason: ALTCHOICE

## 2024-11-07 RX ORDER — BENZOCAINE/MENTHOL 6 MG-10 MG
1 LOZENGE MUCOUS MEMBRANE DAILY PRN
Start: 2024-11-07 | End: 2024-11-12 | Stop reason: ALTCHOICE

## 2024-11-07 RX ORDER — ACETAMINOPHEN 325 MG/1
650 TABLET ORAL EVERY 6 HOURS PRN
Qty: 24 TABLET
Start: 2024-11-07 | End: 2024-11-10

## 2024-11-07 RX ADMIN — IBUPROFEN 600 MG: 600 TABLET, FILM COATED ORAL at 04:12

## 2024-11-07 RX ADMIN — IBUPROFEN 600 MG: 600 TABLET, FILM COATED ORAL at 10:18

## 2024-11-07 NOTE — PLAN OF CARE
Problem: PAIN - ADULT  Goal: Verbalizes/displays adequate comfort level or baseline comfort level  Description: Interventions:  - Encourage patient to monitor pain and request assistance  - Assess pain using appropriate pain scale  - Administer analgesics based on type and severity of pain and evaluate response  - Implement non-pharmacological measures as appropriate and evaluate response  - Consider cultural and social influences on pain and pain management  - Notify physician/advanced practitioner if interventions unsuccessful or patient reports new pain  Outcome: Progressing     Problem: INFECTION - ADULT  Goal: Absence or prevention of progression during hospitalization  Description: INTERVENTIONS:  - Assess and monitor for signs and symptoms of infection  - Monitor lab/diagnostic results  - Monitor all insertion sites, i.e. indwelling lines, tubes, and drains  - Monitor endotracheal if appropriate and nasal secretions for changes in amount and color  - [place] appropriate cooling/warming therapies per order  - Administer medications as ordered  - Instruct and encourage patient and family to use good hand hygiene technique  - Identify and instruct in appropriate isolation precautions for identified infection/condition  Outcome: Progressing     Problem: DISCHARGE PLANNING  Goal: Discharge to home or other facility with appropriate resources  Description: INTERVENTIONS:  - Identify barriers to discharge w/patient and caregiver  - Arrange for needed discharge resources and transportation as appropriate  - Identify discharge learning needs (meds, wound care, etc.)  - Arrange for interpretive services to assist at discharge as needed  - Refer to Case Management Department for coordinating discharge planning if the patient needs post-hospital services based on physician/advanced practitioner order or complex needs related to functional status, cognitive ability, or social support system  Outcome: Progressing      Problem: Knowledge Deficit  Goal: Patient/family/caregiver demonstrates understanding of disease process, treatment plan, medications, and discharge instructions  Description: Complete learning assessment and assess knowledge base.  Interventions:  - Provide teaching at level of understanding  - Provide teaching via preferred learning methods  Outcome: Progressing     Problem: POSTPARTUM  Goal: Experiences normal postpartum course  Description: INTERVENTIONS:  - Monitor maternal vital signs  - Assess uterine involution and lochia  Outcome: Progressing     Problem: POSTPARTUM  Goal: Appropriate maternal -  bonding  Description: INTERVENTIONS:  - Identify family support  - Assess for appropriate maternal/infant bonding   -Encourage maternal/infant bonding opportunities  - Referral to  or  as needed  Outcome: Progressing     Problem: POSTPARTUM  Goal: Establishment of infant feeding pattern  Description: INTERVENTIONS:  - Assess breast/bottle feeding  - Refer to lactation as needed  Outcome: Progressing

## 2024-11-07 NOTE — PLAN OF CARE
Problem: PAIN - ADULT  Goal: Verbalizes/displays adequate comfort level or baseline comfort level  Description: Interventions:  - Encourage patient to monitor pain and request assistance  - Assess pain using appropriate pain scale  - Administer analgesics based on type and severity of pain and evaluate response  - Implement non-pharmacological measures as appropriate and evaluate response  - Consider cultural and social influences on pain and pain management  - Notify physician/advanced practitioner if interventions unsuccessful or patient reports new pain  Outcome: Progressing     Problem: INFECTION - ADULT  Goal: Absence or prevention of progression during hospitalization  Description: INTERVENTIONS:  - Assess and monitor for signs and symptoms of infection  - Monitor lab/diagnostic results  - Monitor all insertion sites, i.e. indwelling lines, tubes, and drains  - Monitor endotracheal if appropriate and nasal secretions for changes in amount and color  - [place] appropriate cooling/warming therapies per order  - Administer medications as ordered  - Instruct and encourage patient and family to use good hand hygiene technique  - Identify and instruct in appropriate isolation precautions for identified infection/condition  Outcome: Progressing  Goal: Absence of fever/infection during neutropenic period  Description: INTERVENTIONS:  - Monitor WBC    Outcome: Progressing     Problem: SAFETY ADULT  Goal: Patient will remain free of falls  Description: INTERVENTIONS:  - Educate patient/family on patient safety including physical limitations  - Instruct patient to call for assistance with activity   - Keep Call bell within reach  - Keep bed low and locked with side rails adjusted as appropriate  - Keep care items and personal belongings within reach  - Initiate and maintain comfort rounds    Outcome: Progressing  Goal: Maintain or return to baseline ADL function  Description: INTERVENTIONS:  -  Assess patient's  ability to carry out ADLs; assess patient's baseline for ADL function and identify physical deficits which impact ability to perform ADLs (bathing, care of mouth/teeth, toileting, grooming, dressing, etc.)  - Assess/evaluate cause of self-care deficits   - Assess range of motion  - Assess patient's mobility; develop plan if impaired  - Assess patient's need for assistive devices and provide as appropriate  - Encourage maximum independence but intervene and supervise when necessary  - Involve family in performance of ADLs  - Assess for home care needs following discharge   - Consider OT consult to assist with ADL evaluation and planning for discharge  - Provide patient education as appropriate  Outcome: Progressing  Goal: Maintains/Returns to pre admission functional level  Description: INTERVENTIONS:  - Set and communicate daily mobility goal to care team and patient/family/caregiver.   - Collaborate with rehabilitation services on mobility goals if consulted  - Out of bed for toileting  - Record patient progress and toleration of activity level   Outcome: Progressing     Problem: DISCHARGE PLANNING  Goal: Discharge to home or other facility with appropriate resources  Description: INTERVENTIONS:  - Identify barriers to discharge w/patient and caregiver  - Arrange for needed discharge resources and transportation as appropriate  - Identify discharge learning needs (meds, wound care, etc.)  - Arrange for interpretive services to assist at discharge as needed  - Refer to Case Management Department for coordinating discharge planning if the patient needs post-hospital services based on physician/advanced practitioner order or complex needs related to functional status, cognitive ability, or social support system  Outcome: Progressing     Problem: Knowledge Deficit  Goal: Patient/family/caregiver demonstrates understanding of disease process, treatment plan, medications, and discharge instructions  Description: Complete  learning assessment and assess knowledge base.  Interventions:  - Provide teaching at level of understanding  - Provide teaching via preferred learning methods  Outcome: Completed  Goal: Verbalizes understanding of labor plan  Description: Assess patient/family/caregiver's baseline knowledge level and ability to understand information.  Provide education via patient/family/caregiver's preferred learning method at appropriate level of understanding.     1. Provide teaching at level of understanding.  2. Provide teaching via preferred learning method(s).  Outcome: Completed     Problem: Knowledge Deficit  Goal: Patient/family/caregiver demonstrates understanding of disease process, treatment plan, medications, and discharge instructions  Description: Complete learning assessment and assess knowledge base.  Interventions:  - Provide teaching at level of understanding  - Provide teaching via preferred learning methods  Outcome: Completed  Goal: Verbalizes understanding of labor plan  Description: Assess patient/family/caregiver's baseline knowledge level and ability to understand information.  Provide education via patient/family/caregiver's preferred learning method at appropriate level of understanding.     1. Provide teaching at level of understanding.  2. Provide teaching via preferred learning method(s).  Outcome: Completed     Problem: POSTPARTUM  Goal: Experiences normal postpartum course  Description: INTERVENTIONS:  - Monitor maternal vital signs  - Assess uterine involution and lochia  Outcome: Progressing  Goal: Appropriate maternal -  bonding  Description: INTERVENTIONS:  - Identify family support  - Assess for appropriate maternal/infant bonding   -Encourage maternal/infant bonding opportunities  - Referral to  or  as needed  Outcome: Progressing  Goal: Establishment of infant feeding pattern  Description: INTERVENTIONS:  - Assess breast/bottle feeding  - Refer to lactation  as needed  Outcome: Progressing  Goal: Incision(s), wounds(s) or drain site(s) healing without S/S of infection  Description: INTERVENTIONS  - Assess and document perineumand surrounding tissue  - Provide patient and family education    Outcome: Progressing

## 2024-11-07 NOTE — LACTATION NOTE
This note was copied from a baby's chart.  CONSULT - LACTATION  Baby Boy (Suleiman Pitts 1 days male MRN: [id number]    Formerly [facility] AN NURSERY Room / Bed: (N)/(N) Encounter: [id number]    Maternal Information     MOTHER:  Kassie Pitts  Maternal Age: 24 y.o.  OB History: # 1 - Date: 24, Sex: Male, Weight: 3350 g (7 lb 6.2 oz), GA: 39w3d, Type: Vaginal, Spontaneous, Apgar1: 8, Apgar5: 8, Living: Living, Birth Comments: None   Previouse breast reduction surgery? No    Lactation history:   Has patient previously breast fed: No   How long had patient previously breast fed:     Previous breast feeding complications:       Past Surgical History:   Procedure Laterality Date    KNEE ARTHROSCOPY Left 2015    patella    WISDOM TOOTH EXTRACTION      X2        Birth information:  YOB: 2025   Time of birth: 1:51 PM   Sex: male   Delivery type: Vaginal, Spontaneous   Birth Weight: 3350 g (7 lb 6.2 oz)   Percent of Weight Change: -2%     Gestational Age: 39w3d   [unfilled]    Assessment     Breast and nipple assessment:  symmetrical, round breasts with dark areolas and everted nipples     Assessment:  no clinical assessment    Feeding assessment: latch difficulty (due to positioning and no first latch until hrs. After birth)  LATCH:  Latch: Grasps breast, tongue down, lips flanged, rhythmic sucking   Audible Swallowing: Spontaneous and intermittent (24 hours old)   Type of Nipple: Everted (After stimulation)   Comfort (Breast/Nipple): Soft/non-tender   Hold (Positioning): Partial assist, teach one side, mother does other, staff holds   LATCH Score: 9          Feeding recommendations:  breast feed on demand. Mom wants to exc. Breast feed and feed expressed milk. Jose De Jesus did not have his first latch after birth even with attempts.     Mom states Jose De Jesus did not latch until 10 pm.     Mom has a medela pump at home    RSB/DC reviewed due to mom choosing to go home  today. Reviewed cluster feeding    Sent EPIC msg for baby and me appt.    Discussion of General Lactation Issues: wants to excl. Breast feed and feed breast milk  First nursing/attempt < 1 hour after birth: attempted but no latch    Skin to skin following delivery:yes - documented    Full term:yes - 39 w 3 days        Interval Breastfeeding History:    Frequency of breast feeding: feeding only one breast  Does mother feel breastfeeding is effective: If no, explain: not sure - baby has done some feeds every hr  Does infant appear satisfied after nursing:If no, explain: feeding every hr  Stooling pattern normal:Yes  Urinating frequently:Yes      Mother Assessment:  Breast: symmetrical, round breasts with dark areolas and everted, round nipples  Nipple Assessment in General: Normal: elongated/eraser, no discoloration and no damage noted.  Mother's Awareness of Feeding Cues                 Recognizes: Yes                  Verbalizes: Yes  Support System: FOB  History of Breastfeeding: first child  Changes/Stressors/Violence: wants to know that baby is getting enough  Concerns/Goals: wants to excl. breastfeed      Infant Assessment:  Behaviors: feeding cues       Latch After Lactation Education/Consult:  Efficiency: cross cradle to hold on the right breast              Lips Flanged: Yes              Depth of latch: deeper with demonstration and teach back of cheeks touching the breast; U shape hold of the breast              Audible Swallow: Yes              Visible Milk: Yes, with HE              Wide Open/ Asymmetrical: Yes, with demonstration of alignment, chin behind the areola and asymmetrical latch              Suck Swallow Cycle: Breathing: yes, Coordinated: yes  Nipple Assessment after latch: Normal: elongated/eraser, no discoloration and no damage noted.  Latch Problems: none with demonstration and teach back    Position After Lactation Education/Consult:  Infant's Ergonomics/Body               Body  "Alignment: Yes, with demonstration of lower lumbar support and bringing the baby up to the breast               Head Supported: Yes, enc. Snug  hold and compression between the shoulder blades               Close to Mom's body/ Lifted/ Supported: Yes, belly to belly, bring Jose De Jesus up to the breast               Mom's Ergonomics/Body: Yes, lower lumbar support, pillows to lift the baby and mom's arms, and foot stool support                           Supported: Yes                           Sitting Back: Yes                           Brings Baby to her breast: Yes  Positioning Problems: none with demonstration and teach back    Personal Pump            Type: medela      Demonstration of Paced Bottle Feeding : discussed new bottle feeding method     Handouts:   RSB/DC    Feeding Plan:     1. Meet early feeding cues  2. Use massage, warmth, hand expression to stimulate breasts  3. Align nipple to nose, place chin on the breast, (move baby not breast) and bring baby to breast when mouth is wide and deep latch is achieved. Use snug hold of baby to support lower mandible.    Education on creating a snug hold of your infant to the breast by verifying the infant's cheek is touching the breast, your infant's chin is deep into the breast tissue, your infant's arms are \"hugging\" the breast, and your infant's lips are flanged on the areola. Bring infant to the breast, not your breast to the infant. Latch should feel like a tugging sensation on the nipple.    (Scan QR code for [employer] - positions)    4. Use breast compressions to stimulate suck  5.Have a \"U\" shape hold under the breast,   6. If baby tires, or unlatches, or you feel Jose De Jesus did not get enough, check hands for signs of satiation.    Reviewed early signs of hunger, including tensing of hands and shoulders - no need to wait for open eyes.  Crying is a late hunger sign.  If baby is crying, soothe baby first and then attempt to latch.  Reviewed normal " sucking patterns: transition from stimulation to nutritive to release or non-nutritive. The goal is to see and hear lots of swallowing.  Reviewed normal nursing pattern: infant could latch on one breast up to 30 minutes or until releases on own. Signs of satiation is open hand with fingers that do not grab your finger.  Discussed difference in sensation of non-nutritive v nutritive sucking      7. If Jose De Jesus is still showing feeding cues, and you have provided each breast at least once, feed any expressed milk via syringe / paced bottle feeding method.    8. Burp baby between the breasts.   9. Allow the baby to do non-nutritive suck and skin to skin at the second breast  10. If baby does not meet feeding goals, pump after each feed to stimulate breasts and have expressed milk for next feed    When baby is not meeting feeding goals, use syringe or the paced bottle feeding method for feeds. Review [employer] on Youtube or scan QR code for [employer] video        Milk Mob        [employer] - positions      Medela pump: Fit flanges so nipple easily moves through tunnel. Press the on button. Pump will automatically start in stimulation mode. After 2 minutes, pump will cycle to expression mode. Use the + and - buttons to increase & decrease suction. After milk stops expressing from the nipple, press the button with the image of the milk droplets. This will take your pump back to stimulation mode. Allow pump to stimulate the breast for another 2 min. Allow the pump to move into expression mode. Cycle between Stimulation and Expression mode at least 3 times in a 20 min. Pumping session.     Discussed 2nd night syndrome and ways to calm infant. Hand out given. Information on hand expression given. Discussed benefits of knowing how to manually express breast including stimulating milk supply, softening nipple for latch and evacuating breast in the event of engorgement.    Education and information provided about  non-nutritive suck, role of colostrum, and benefits of skin to skin.        Christy Hume, MA 11/7/2024 10:17 AM

## 2024-11-07 NOTE — ASSESSMENT & PLAN NOTE
- Pain well controlled with oral analgesics  - Voiding spontaneously  - Tolerating PO fluids and solids  - Ambulating without difficulty  - Breastfeeding  - Anticipate discharge PPD#1-2

## 2024-11-07 NOTE — PROGRESS NOTES
"Progress Note - OB/GYN  Kassie Pitts 24 y.o. female MRN: [id number]  Unit/Bed#:  316-01 Encounter: [id number]    Assessment and Plan     Kassie Pitts is a patient of: [facility]. She is PPD# 1 s/p  spontaneous vaginal delivery  Recovering well and is stable       Gestational hypertension  Assessment & Plan  CBC wnl, CMP pending  Monitor BP's    Systolic (12hrs), Av , Min:104 , Max:116   Diastolic (12hrs), Av, Min:65, Max:72          Spontaneous vaginal delivery  Assessment & Plan  - Pain well controlled with oral analgesics  - Voiding spontaneously  - Tolerating PO fluids and solids  - Ambulating without difficulty  - Breastfeeding...  - Anticipate discharge PPD#1-2      Mild intermittent asthma  Assessment & Plan  Breo-Ellipta 2 puffs BID ordered  Avoid hemabate        Disposition    - Anticipate discharge home on PPD# 1      Subjective/Objective     Chief Complaint: Postpartum State     Subjective:    Kassie Pitts is PPD#1 s/p  spontaneous vaginal delivery. She has no current complaints.  Pain is well controlled.  Patient is currently voiding.  She is ambulating.  Patient is currently passing flatus and has had no bowel movement. She is tolerating PO, and denies nausea or vomitting. Patient denies fever, chills, chest pain, shortness of breath, or calf tenderness. Lochia is normal. She is  Breastfeeding. She is recovering well and is stable. She would like to go home today as long as her blood pressures are stable.         Vitals:   /70 (BP Location: Left arm)   Pulse 69   Temp 97.6 °F (36.4 °C) (Oral)   Resp 18   Ht 5' 2.99\" (1.6 m)   Wt 79.6 kg (175 lb 6.4 oz)   LMP 2024 (Exact Date)   SpO2 97%   Breastfeeding Unknown   BMI 31.08 kg/m²       Intake/Output Summary (Last 24 hours) at 2024 0732  Last data filed at 2024 1915  Gross per 24 hour   Intake 2000 ml   Output 1500 ml   Net 500 ml       Invasive Devices       Peripheral Intravenous Line  " Duration             Peripheral IV 11/05/24 Distal;Dorsal (posterior);Right Forearm 1 day                    Physical Exam:   GEN: Kassie Pitts appears well, alert and oriented x 3, pleasant and cooperative   CARDIO: RRR, no murmurs or rubs  RESP:  CTAB, no wheezes or rales  ABDOMEN: soft, no tenderness, no distention, fundus @ umbilicus  EXTREMITIES: non tender, no erythema      Labs:     Hemoglobin   Date Value Ref Range Status   11/05/2024 12.1 11.5 - 15.4 g/dL Final   08/16/2024 12.1 11.5 - 15.4 g/dL Final     WBC   Date Value Ref Range Status   11/05/2024 16.29 (H) 4.31 - 10.16 Thousand/uL Final   08/16/2024 9.86 4.31 - 10.16 Thousand/uL Final     Platelets   Date Value Ref Range Status   11/05/2024 241 149 - 390 Thousands/uL Final   08/16/2024 218 149 - 390 Thousands/uL Final     Creatinine   Date Value Ref Range Status   11/05/2024 0.76 0.60 - 1.30 mg/dL Final     Comment:     Standardized to IDMS reference method   04/29/2024 0.59 (L) 0.60 - 1.30 mg/dL Final     Comment:     Standardized to IDMS reference method   01/18/2018 0.84 0.50 - 1.00 mg/dL Final     AST   Date Value Ref Range Status   11/05/2024 20 13 - 39 U/L Final   04/29/2024 19 13 - 39 U/L Final   01/18/2018 20 12 - 32 U/L Final     ALT   Date Value Ref Range Status   11/05/2024 22 7 - 52 U/L Final     Comment:     Specimen collection should occur prior to Sulfasalazine administration due to the potential for falsely depressed results.    04/29/2024 17 7 - 52 U/L Final     Comment:     Specimen collection should occur prior to Sulfasalazine administration due to the potential for falsely depressed results.    01/18/2018 14 5 - 32 U/L Final          Francisco Herrera MD  11/7/2024  7:32 AM

## 2024-11-07 NOTE — ASSESSMENT & PLAN NOTE
CBC wnl, CMP pending  Monitor BP's    Systolic (12hrs), Av , Min:104 , Max:116   Diastolic (12hrs), Av, Min:65, Max:72

## 2024-11-08 NOTE — UTILIZATION REVIEW
"NOTIFICATION OF INPATIENT ADMISSION   MATERNITY/DELIVERY AUTHORIZATION REQUEST   SERVICING FACILITY:   [facility]  Parent Child Health - L&D, [place], NICU  [address] [place] [address]  Tax ID: [id number]  NPI: [id number]   ATTENDING PROVIDER:  Attending Name and NPI#: Migdalia Savage Do [[id number]]  Address: [address] [place] [address] [place] [address]  Phone: [phone]   ADMISSION INFORMATION:  Place of Service: Inpatient [facility]  Place of Service Code: 21  Inpatient Admission Date/Time: 24  7:19 PM  Discharge Date/Time: 2024  4:46 PM  Admitting Diagnosis Code/Description:  39 weeks gestation of pregnancy [Z3A.39]  Uterine contractions during pregnancy [O47.9]  Encounter for full-term uncomplicated delivery [O80]     Mother: Kassie Pitts 2000 Estimated Date of Delivery: 11/10/24  Delivering clinician: Migdalia Savage   OB History          1    Para   1    Term   1       0    AB   0    Living   1         SAB   0    IAB   0    Ectopic   0    Multiple   0    Live Births   1                Name & MRN:   Information for the patient's :  Jose De Jesus Avitia [[id number]]   [place] Delivery Information:  Sex: male  Delivered 2024 1:51 PM by Vaginal, Spontaneous; Gestational Age: 39w3d     Measurements:  Weight: 7 lb 6.2 oz (3350 g);  Height: 20\"    APGAR 1 minute 5 minutes 10 minutes   Totals: 8 8       UTILIZATION REVIEW CONTACT:  Briana Hanson, Utilization   Network Utilization Review Department  Phone: [phone]  Fax [phone]  Email: [email or web address]  Contact for approvals/pending authorizations, clinical reviews, and discharge.     PHYSICIAN ADVISORY SERVICES:  Medical Necessity Denial & [employer]ef[employer] Review  Phone: [phone]  Fax: [phone]  Email: [email or web address]     DISCHARGE SUPPORT TEAM:  For Patients Discharge Needs & Updates  Phone: [phone] opt. 2 Fax: " [phone]  Email: [email or web address]

## 2024-11-12 ENCOUNTER — POSTPARTUM VISIT (OUTPATIENT)
Dept: OBGYN CLINIC | Facility: CLINIC | Age: 24
End: 2024-11-12

## 2024-11-12 VITALS — SYSTOLIC BLOOD PRESSURE: 106 MMHG | DIASTOLIC BLOOD PRESSURE: 72 MMHG | WEIGHT: 162.4 LBS | BODY MASS INDEX: 28.78 KG/M2

## 2024-11-12 DIAGNOSIS — O13.9 GESTATIONAL HYPERTENSION, ANTEPARTUM: ICD-10-CM

## 2024-11-12 PROBLEM — Z3A.39 39 WEEKS GESTATION OF PREGNANCY: Status: RESOLVED | Noted: 2024-04-03 | Resolved: 2024-11-12

## 2024-11-12 PROBLEM — Z34.90 ENCOUNTER FOR INDUCTION OF LABOR: Status: RESOLVED | Noted: 2024-11-05 | Resolved: 2024-11-12

## 2024-11-12 LAB — PLACENTA IN STORAGE: NORMAL

## 2024-11-12 PROCEDURE — 99024 POSTOP FOLLOW-UP VISIT: CPT | Performed by: STUDENT IN AN ORGANIZED HEALTH CARE EDUCATION/TRAINING PROGRAM

## 2024-11-12 NOTE — ASSESSMENT & PLAN NOTE
-BP today wnl, asymptomatic   -continue home BP monitoring  -preeclampsia precautions provided

## 2024-11-12 NOTE — PROGRESS NOTES
Ambulatory Visit  Name: Kassie Pitts      : 2000      MRN: [id number]  Encounter Provider: Migdalia Savage DO  Encounter Date: 2024   Encounter department: [facility]    Assessment & Plan  Spontaneous vaginal delivery  -doing well post partum   -follow up in 2 weeks for post partum visit          Gestational hypertension, antepartum  -BP today wnl, asymptomatic   -continue home BP monitoring  -preeclampsia precautions provided           History of Present Illness     Kassie Pitts is a 24 y.o. female  s/p  24 presents for BP check. Denies headaches, changes in vision, chest pain, palpitations, nausea, vomiting, fevers, chills. Pain well controlled, minimal vaginal bleeding, breast feeding without difficulty, EPDS 3, denies SI/HI. Sleeping well, has good support at home from partner.       Review of Systems   Constitutional:  Negative for appetite change, chills, fatigue and fever.   Respiratory:  Negative for cough, chest tightness, shortness of breath and wheezing.    Cardiovascular:  Negative for chest pain, palpitations and leg swelling.   Gastrointestinal:  Negative for abdominal distention, abdominal pain, constipation, diarrhea, nausea and vomiting.   Endocrine: Negative for cold intolerance, heat intolerance and polyuria.   Genitourinary:  Negative for difficulty urinating, dyspareunia, dysuria, genital sores, menstrual problem, vaginal bleeding, vaginal discharge and vaginal pain.   Neurological:  Negative for dizziness, weakness, light-headedness and headaches.           Objective     /72   Wt 73.7 kg (162 lb 6.4 oz)   LMP 2024 (Exact Date)   Breastfeeding Yes   BMI 28.78 kg/m²     Physical Exam  Constitutional:       General: She is not in acute distress.     Appearance: Normal appearance. She is not ill-appearing.   Cardiovascular:      Rate and Rhythm: Normal rate.   Pulmonary:      Effort: Pulmonary effort is  normal. No respiratory distress.   Abdominal:      General: Abdomen is flat. There is no distension.      Palpations: Abdomen is soft.      Tenderness: There is no abdominal tenderness. There is no guarding or rebound.   Musculoskeletal:      Right lower leg: No edema.      Left lower leg: No edema.   Neurological:      General: No focal deficit present.      Mental Status: She is alert and oriented to person, place, and time.   Psychiatric:         Mood and Affect: Mood normal.         Behavior: Behavior normal.         Thought Content: Thought content normal.         Judgment: Judgment normal.

## 2024-11-15 ENCOUNTER — TELEPHONE (OUTPATIENT)
Dept: OBGYN CLINIC | Facility: CLINIC | Age: 24
End: 2024-11-15

## 2024-12-03 ENCOUNTER — POSTPARTUM VISIT (OUTPATIENT)
Dept: OBGYN CLINIC | Facility: CLINIC | Age: 24
End: 2024-12-03

## 2024-12-03 VITALS — WEIGHT: 156.2 LBS | DIASTOLIC BLOOD PRESSURE: 68 MMHG | BODY MASS INDEX: 27.68 KG/M2 | SYSTOLIC BLOOD PRESSURE: 96 MMHG

## 2024-12-03 PROCEDURE — 99024 POSTOP FOLLOW-UP VISIT: CPT | Performed by: OBSTETRICS & GYNECOLOGY

## 2024-12-03 NOTE — PROGRESS NOTES
Kassie Pitts  2000    S:  24 y.o. female here for postpartum visit.  She is s/p Uncomplicated vaginal delivery on 11/6/2024.   Gender: male   Apgars: 8/8  Weight: 7 lbs 6.2 oz  Her lochia has just about resolved.  (Occasional spotting)  She is Breastfeeding without problems.   Her pregnancy was complicated by: asthma, induction for non-reassuring fetal heart tones in triage during rule out labor evaluation.  She denies postpartum blues/depression.  [place] score was 6.    We discussed contraceptive options in detail including birth control pills, patches, NuvaRing, DepoProvera, Mirena/Kyleena IUD, Nexplanon in detail.  At this point she would like withdrawal (used for 7 years successfully before child; allergic to many things to cannot use condoms; doesn't want hormonal contraception).  Will consider Paraguard.      O:   She appears well and in no distress  Abdomen is soft and nontender  External genitals are normal  Vagina is normal  Cervix, uterus and adnexa are nontender, no masses palpable.     A/P:  Postpartum visit.   She will return in 2 months for her yearly exam, sooner PRN.    Has pelvic PT referral already from during pregnancy - will return.

## 2025-01-03 ENCOUNTER — TELEPHONE (OUTPATIENT)
Age: 25
End: 2025-01-03

## 2025-01-03 NOTE — LETTER
January 3, 2025     Patient: Kassie Pitts  YOB: 2000  Date of Visit: 1/3/2025      To Whom it May Concern:    Kassie Pitts is under my professional care. Kassie was seen in my office on 12/3/2024 Kassie may return to work on 2/2/2025 .    If you have any questions or concerns, please don't hesitate to call.         Sincerely,          [facility]        CC: No Recipients
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
